# Patient Record
Sex: FEMALE | Race: WHITE | NOT HISPANIC OR LATINO | Employment: FULL TIME | URBAN - METROPOLITAN AREA
[De-identification: names, ages, dates, MRNs, and addresses within clinical notes are randomized per-mention and may not be internally consistent; named-entity substitution may affect disease eponyms.]

---

## 2017-04-22 ENCOUNTER — TELEPHONE (OUTPATIENT)
Dept: FAMILY MEDICINE | Facility: CLINIC | Age: 57
End: 2017-04-22

## 2017-04-22 NOTE — TELEPHONE ENCOUNTER
4/22/2017    Call Regarding Preventive Health Screening Colonoscopy and Cervical/PAP    Attempt 1    Message on voicemail     Comments:       Outreach   KV

## 2017-06-01 DIAGNOSIS — I10 ESSENTIAL HYPERTENSION WITH GOAL BLOOD PRESSURE LESS THAN 140/90: ICD-10-CM

## 2017-06-01 NOTE — TELEPHONE ENCOUNTER
amLODIPine (NORVASC) 5 MG tablet      Last Written Prescription Date: 5/24/16  Last Fill Quantity: 90, # refills: 3  Last Office Visit with [facility] [place] or [facility] prescribing provider: 5/24/16       Potassium   Date Value Ref Range Status   05/24/2016 3.6 3.4 - 5.3 mmol/L Final     Creatinine   Date Value Ref Range Status   05/24/2016 0.59 0.52 - 1.04 mg/dL Final     BP Readings from Last 3 Encounters:   05/24/16 136/80   01/14/15 134/86   09/12/13 130/75       atenolol (TENORMIN) 100 MG tablet      Last Written Prescription Date: 5/24/16  Last Fill Quantity: 90, # refills: 3  Last Office Visit with [facility] [place] or [facility] prescribing provider: 5/24/16       Potassium   Date Value Ref Range Status   05/24/2016 3.6 3.4 - 5.3 mmol/L Final     Creatinine   Date Value Ref Range Status   05/24/2016 0.59 0.52 - 1.04 mg/dL Final     BP Readings from Last 3 Encounters:   05/24/16 136/80   01/14/15 134/86   09/12/13 130/75         hydrochlorothiazide (HYDRODIURIL) 25 MG tablet      Last Written Prescription Date: 5/24/16  Last Fill Quantity: 90, # refills: 3  Last Office Visit with [facility] [place] or [facility] prescribing provider: 5/24/16       Potassium   Date Value Ref Range Status   05/24/2016 3.6 3.4 - 5.3 mmol/L Final     Creatinine   Date Value Ref Range Status   05/24/2016 0.59 0.52 - 1.04 mg/dL Final     BP Readings from Last 3 Encounters:   05/24/16 136/80   01/14/15 134/86   09/12/13 130/75

## 2017-06-07 RX ORDER — AMLODIPINE BESYLATE 5 MG/1
TABLET ORAL
Qty: 90 TABLET | Refills: 0 | Status: SHIPPED | OUTPATIENT
Start: 2017-06-07 | End: 2017-06-09

## 2017-06-07 RX ORDER — HYDROCHLOROTHIAZIDE 25 MG/1
TABLET ORAL
Qty: 90 TABLET | Refills: 0 | Status: SHIPPED | OUTPATIENT
Start: 2017-06-07 | End: 2017-06-09

## 2017-06-07 RX ORDER — ATENOLOL 100 MG/1
TABLET ORAL
Qty: 90 TABLET | Refills: 0 | Status: SHIPPED | OUTPATIENT
Start: 2017-06-07 | End: 2017-06-09

## 2017-06-09 ENCOUNTER — OFFICE VISIT (OUTPATIENT)
Dept: FAMILY MEDICINE | Facility: CLINIC | Age: 57
End: 2017-06-09
Payer: COMMERCIAL

## 2017-06-09 VITALS
SYSTOLIC BLOOD PRESSURE: 137 MMHG | HEIGHT: 62 IN | DIASTOLIC BLOOD PRESSURE: 65 MMHG | TEMPERATURE: 98 F | HEART RATE: 65 BPM | WEIGHT: 142.9 LBS | BODY MASS INDEX: 26.3 KG/M2

## 2017-06-09 DIAGNOSIS — Z12.4 SCREENING FOR MALIGNANT NEOPLASM OF CERVIX: ICD-10-CM

## 2017-06-09 DIAGNOSIS — Z00.00 ANNUAL PHYSICAL EXAM: Primary | ICD-10-CM

## 2017-06-09 DIAGNOSIS — F41.9 ANXIETY: ICD-10-CM

## 2017-06-09 DIAGNOSIS — I10 ESSENTIAL HYPERTENSION WITH GOAL BLOOD PRESSURE LESS THAN 140/90: ICD-10-CM

## 2017-06-09 DIAGNOSIS — E78.1 HIGH TRIGLYCERIDES: ICD-10-CM

## 2017-06-09 LAB
CHOLEST SERPL-MCNC: 216 MG/DL
CREAT SERPL-MCNC: 0.63 MG/DL (ref 0.52–1.04)
GFR SERPL CREATININE-BSD FRML MDRD: NORMAL ML/MIN/1.7M2
GLUCOSE SERPL-MCNC: 115 MG/DL (ref 70–99)
HCV AB SERPL QL IA: NORMAL
HDLC SERPL-MCNC: 34 MG/DL
LDLC SERPL CALC-MCNC: 132 MG/DL
NONHDLC SERPL-MCNC: 182 MG/DL
POTASSIUM SERPL-SCNC: 3.4 MMOL/L (ref 3.4–5.3)
SODIUM SERPL-SCNC: 141 MMOL/L (ref 133–144)
TRIGL SERPL-MCNC: 251 MG/DL

## 2017-06-09 PROCEDURE — 82947 ASSAY GLUCOSE BLOOD QUANT: CPT | Performed by: NURSE PRACTITIONER

## 2017-06-09 PROCEDURE — G0145 SCR C/V CYTO,THINLAYER,RESCR: HCPCS | Performed by: NURSE PRACTITIONER

## 2017-06-09 PROCEDURE — 80061 LIPID PANEL: CPT | Performed by: NURSE PRACTITIONER

## 2017-06-09 PROCEDURE — 86803 HEPATITIS C AB TEST: CPT | Performed by: NURSE PRACTITIONER

## 2017-06-09 PROCEDURE — 87624 HPV HI-RISK TYP POOLED RSLT: CPT | Performed by: NURSE PRACTITIONER

## 2017-06-09 PROCEDURE — 99396 PREV VISIT EST AGE 40-64: CPT | Performed by: NURSE PRACTITIONER

## 2017-06-09 PROCEDURE — 36415 COLL VENOUS BLD VENIPUNCTURE: CPT | Performed by: NURSE PRACTITIONER

## 2017-06-09 PROCEDURE — 84132 ASSAY OF SERUM POTASSIUM: CPT | Performed by: NURSE PRACTITIONER

## 2017-06-09 PROCEDURE — 82565 ASSAY OF CREATININE: CPT | Performed by: NURSE PRACTITIONER

## 2017-06-09 PROCEDURE — 84295 ASSAY OF SERUM SODIUM: CPT | Performed by: NURSE PRACTITIONER

## 2017-06-09 RX ORDER — AMLODIPINE BESYLATE 5 MG/1
5 TABLET ORAL DAILY
Qty: 90 TABLET | Refills: 3 | Status: SHIPPED | OUTPATIENT
Start: 2017-06-09 | End: 2018-08-23

## 2017-06-09 RX ORDER — HYDROCHLOROTHIAZIDE 25 MG/1
25 TABLET ORAL DAILY
Qty: 90 TABLET | Refills: 3 | Status: SHIPPED | OUTPATIENT
Start: 2017-06-09 | End: 2018-08-23

## 2017-06-09 RX ORDER — ATENOLOL 100 MG/1
TABLET ORAL
Qty: 90 TABLET | Refills: 3 | Status: SHIPPED | OUTPATIENT
Start: 2017-06-09 | End: 2018-08-23

## 2017-06-09 NOTE — PATIENT INSTRUCTIONS

## 2017-06-09 NOTE — NURSING NOTE
"Chief Complaint   Patient presents with     Physical     Refill Request     pending       Initial /65  Pulse 65  Temp 98  F (36.7  C) (Tympanic)  Ht 5' 1.75\" (1.568 m)  Wt 142 lb 14.4 oz (64.8 kg)  BMI 26.35 kg/m2 Estimated body mass index is 26.35 kg/(m^2) as calculated from the following:    Height as of this encounter: 5' 1.75\" (1.568 m).    Weight as of this encounter: 142 lb 14.4 oz (64.8 kg).  Medication Reconciliation: complete  "

## 2017-06-09 NOTE — PROGRESS NOTES
HPI:  Ashley Coombs is a 56 year old female who presents to clinic today for an annual physical exam:    Submitted on 6/7/2017   Annual Exam:        Getting at least 3 servings of Calcium per day:: NO    Bi-annual eye exam:: NO    Dental care twice a year:: Yes    Sleep apnea or symptoms of sleep apnea:: None    Diet:: Regular (no restrictions)    Frequency of exercise:: 1 day/week    Taking medications regularly:: Yes    Medication side effects:: None    Additional concerns today:: No    PHQ-2 Score: 1    Duration of exercise:: 30-45 minutes     She has the following medical issues :   Patient Active Problem List   Diagnosis     Essential hypertension     Generalized anxiety disorder     Other specified periodontal diseases     Tobacco use disorder     Condyloma acuminatum     Hyperlipidemia LDL goal <130     Breast cancer (H)     Post-menopausal bleeding     GERD (gastroesophageal reflux disease)     Essential hypertension with goal blood pressure less than 140/90     Patient Active Problem List   Diagnosis     Essential hypertension     Generalized anxiety disorder     Other specified periodontal diseases     Tobacco use disorder     Condyloma acuminatum     Hyperlipidemia LDL goal <130     Breast cancer (H)     Post-menopausal bleeding     GERD (gastroesophageal reflux disease)     Essential hypertension with goal blood pressure less than 140/90     MEDICATIONS:  Current Outpatient Prescriptions   Medication Sig Dispense Refill     amLODIPine (NORVASC) 5 MG tablet Take 1 tablet (5 mg) by mouth daily 90 tablet 3     hydrochlorothiazide (HYDRODIURIL) 25 MG tablet Take 1 tablet (25 mg) by mouth daily 90 tablet 3     atenolol (TENORMIN) 100 MG tablet TAKE 1 TABLET (100 MG) BY MOUTH DAILY 90 tablet 3     anastrozole (ARIMIDEX) 1 MG tablet Take 1 tablet by mouth daily. 90 tablet 3     [DISCONTINUED] amLODIPine (NORVASC) 5 MG tablet TAKE 1 TABLET (5 MG) BY MOUTH ONCE DAILY 90 tablet 0     [DISCONTINUED] atenolol  (TENORMIN) 100 MG tablet TAKE 1 TABLET (100 MG) BY MOUTH DAILY 90 tablet 0     [DISCONTINUED] hydrochlorothiazide (HYDRODIURIL) 25 MG tablet TAKE 1 TABLET (25 MG) BY MOUTH ONCE DAILY 90 tablet 0     cholecalciferol 5000 UNITS CAPS Take  by mouth.       Multiple Vitamin (MULTIVITAMIN OR) Take 1 tablet by mouth daily.       ascorbic acid (VITAMIN C) 1000 MG TABS Take 1,000 mg by mouth daily.       ALLERGIES: Zyban [bupropion hcl]    PAST MEDICAL HX:   Past Medical History:   Diagnosis Date     Closed fracture of unspecified part of fibula 2004    Right distal fibula fracture     Panic disorder without agoraphobia      PAST SURGICAL HX:   Past Surgical History:   Procedure Laterality Date     BIOPSY BREAST NEEDLE LOCALIZATION, BIOPSY NODE SENTINEL, COMBINED  7/27/2011    Procedure:COMBINED BIOPSY BREAST NEEDLE LOCALIZATION, BIOPSY NODE SENTINEL; Oeft  breast lumpectomy with sentinel lymph node biopsy-inj by radiologist at1330-path in house; Surgeon:ISADORA AGUILAR; Location:[facility] OR     GYN SURGERY       LUMPECTOMY BREAST  8/10/2011    Procedure:LUMPECTOMY BREAST; Re Excision Left Lumpectomy; Surgeon:ISADORA AGUILAR; Location:[facility] OR     SURGICAL HISTORY OF -   1978    [place] teeth extracted     FAMILY MEDICAL HX:   Family History   Problem Relation Age of Onset     Arthritis Mother      C.A.D. Mother      CANCER Mother      brain tumor     Hypertension Mother      Thyroid Disease Mother      Hypertension Father      Alcohol/Drug Father      Cardiovascular Father 83     AAA     OSTEOPOROSIS Sister 55     osteopenia     Psychotic Disorder Sister      anxiety     CANCER Maternal Grandmother      uterine cancer     Thyroid Disease Maternal Grandmother      C.A.D. Maternal Aunt      C.A.D. Maternal Uncle      DIABETES No family hx of      CEREBROVASCULAR DISEASE No family hx of      Breast Cancer No family hx of      Cancer - colorectal No family hx of      IMMUNIZATION HX:   Immunization History  "  Administered Date(s) Administered     TD (ADULT, 7+) 10/28/2004     TDAP Vaccine (Adacel) 08/13/2012     SOCIAL HX:   Social History     Social History     Marital status:      Spouse name: N/A     Number of children: N/A     Years of education: N/A     Social History Main Topics     Smoking status: Current Every Day Smoker     Packs/day: 0.50     Years: 30.00     Types: Cigarettes     Smokeless tobacco: Never Used      Comment: cutting back and trying to quit     Alcohol use 0.0 oz/week     0 Standard drinks or equivalent per week      Comment: 5 beers per month     Drug use: No     Sexual activity: Yes     Partners: Male     Birth control/ protection: Condom     Other Topics Concern     Parent/Sibling W/ Cabg, Mi Or Angioplasty Before 65f 55m? No     Social History Narrative     ROS:  CONSTITUTIONAL: no fatigue, no unexpected change in weight. She c/o of so much work-related stress and anxiety. This is causing issues with coping (poor sleep hygiene, not able to exercise, still smoking).  SKIN: no worrisome rashes, no worrisome moles, no worrisome lesions. She does have a healing tick bite.  EYES: no acute vision problems or changes  ENT: no ear problems, no mouth problems, no throat problems. Has baseline dental issues and is being managed by dentist 4 times/yr.  RESP: no significant cough, no shortness of breath. She states that sometimes she notices a \"smokers cough.\"  BREAST: no masses, no tenderness, no discharge.  CV: no chest pain, no palpitations, no new or worsening peripheral edema.  GI: no nausea, no vomiting, no constipation, no diarrhea.  : no frequency, no dysuria, no hematuria.  MS: no claudication, no myalgias, no joint aches  NEURO: no weakness, no dizziness, no syncope, no headaches  ENDOCRINE: no skin/hair changes  PSYCHIATRIC: situational anxiety as noted above.    OBJECTIVE:  /65  Pulse 65  Temp 98  F (36.7  C) (Tympanic)  Ht 5' 1.75\" (1.568 m)  Wt 142 lb 14.4 oz (64.8 kg) "  BMI 26.35 kg/m2   Wt Readings from Last 1 Encounters:   06/09/17 142 lb 14.4 oz (64.8 kg)     Constitutional: mild distress, tearful, pleasant, hypervigilant.   Eyes: anicteric,conjunctiva pink, normal extra-ocular movements. She is overdue to see her ophthalmologist.    Ears, Nose and Throat: tympanic membranes clear, nose clear and free of lesions, throat clear.   Neck: supple with full range of motion, no thyromegaly.   Cardiovascular: regular rate and rhythm, normal S1 and S2, no murmurs, rubs or gallops, peripheral pulses full and symmetric   Respiratory: clear to auscultation, no wheezes or crackles, normal breath sounds   Gastrointestinal: positive bowel sounds, nontender, no hepatosplenomegaly, no masses    Pelvic:  Normal genital architecture without lesions, erythema or abnormal secretions Bartholin's, Urethra, Lutsen's normal. Urethral meatus: normal. Urethra: no masses, tenderness, or scarring      Vagina: moist, pink, rugae with creamy, white and odorless secretions  Cervix: no lesions and pink, moist, closed, without lesion or CMT  Musculoskeletal: full range of motion, no edema   Skin: no concerning lesions, no jaundice, temp normal. Tick bite to right axilla is healing, scab present without edema/rash/erythema.    Neurological: cranial nerves intact, normal strength and sensation, normal gait, normal speech, no tremor   Psychological: anxious. frustrated.  LYMPH: no axillary, cervical,  supraclavicular, or infraclavicular nodes      ASSESSMENT/PLAN:    (Z00.00) Annual physical exam  (primary encounter diagnosis)  Comment: Patient taking several medications, important to check renal function. Also due for recommended Hep C screening per guidelines (patient agreed to this testing). Patient is fasting today, therefore blood glucose and cholesterol will be checked. Patient agreed with this plan of care.  Patient states that her oncologist manages her mammogram, we ordered this annual testing for August  (future).   Plan: Glucose, Creatinine, Potassium, Lipid panel         reflex to direct LDL, Hepatitis C antibody, *MA        Screening Digital Bilateral        Stable. In-process.    (I10) Essential hypertension with goal blood pressure less than 140/90  Comment: The patient is meeting her BP goal with the prescribed medications. Patient counseled on exercise, healthy diet, and decreasing stress. When we brought up smoking cessation, the patient became tearful and was not ready to pursue smoking cessation at this time.  Plan: amLODIPine (NORVASC) 5 MG tablet,         hydrochlorothiazide (HYDRODIURIL) 25 MG tablet,        atenolol (TENORMIN) 100 MG tablet, Sodium        Stable.     (E78.1) High triglycerides  Comment: As noted above.  Plan: Lipid panel reflex to direct LDL        Stable.    (Z12.4) Screening for malignant neoplasm of cervix  Comment: Patient due for this screening per the guideline recommendations.    Plan: Pap imaged thin layer screen with HPV -         recommended age 30 - 65 years (select HPV order        below)        Stable. In-process.    (F41.9) Anxiety  Comment: The patient works as a  and presented tearfully stating that she is working too much and is not allowed to take vacation. She repeatedly stated that her coworkers give her too much work. She cannot take vacation because the work will pile up. She described working up to 7 days/week sometimes.  We discussed boundaries, saying no, her nursing home plan, coworkers who can support her, support from her spouse, etc.   Plan:  Patient given information on Mindfulness-Based Stress Reduction, but outwardly stated she didn't know how she could find the time. We recommended she pursue cognitive behavioral therapy, and even recommended in-house services here.  While she was very open to therapy, she stated that time was a barrier.  She assured us that she was going to consider this as an option to work through her  job-related stress/anxiety. All parties agreed that pursuing a pharmacological therapy was not appropriate at this time.     Here for preventive examination  Breast examination performed. No   Pap   Yes  If normal PAP results pap every 3 years.  Breast cancer screening indicated and ordered for August. Collaborate w/ oncology.   Colorectal screening indicated and patient has a kit at home which she will complete and mail.  Immunizations reviewed and updated in EPIC, reviewed and discussed briefly  Labs ordered PAP, Glucose, K+, Cr and BUN  Counseling was provided in the following areas:  regular exercise, weight management, healthy diet/nutrition, smoking cessation (patient not ready at this time due to work stress to pursue smoking cessation).    Patient Instructions     Preventive Health Recommendations  Female Ages 50 - 64    Yearly exam: See your health care provider every year in order to  o Review health changes.   o Discuss preventive care.    o Review your medicines if your doctor has prescribed any.      Get a Pap test every three years (unless you have an abnormal result and your provider advises testing more often).    If you get Pap tests with HPV test, you only need to test every 5 years, unless you have an abnormal result.     You do not need a Pap test if your uterus was removed (hysterectomy) and you have not had cancer.    You should be tested each year for STDs (sexually transmitted diseases) if you're at risk.     Have a mammogram every 1 to 2 years.    Have a colonoscopy at age 50, or have a yearly FIT test (stool test). These exams screen for colon cancer.      Have a cholesterol test every 5 years, or more often if advised.    Have a diabetes test (fasting glucose) every three years. If you are at risk for diabetes, you should have this test more often.     If you are at risk for osteoporosis (brittle bone disease), think about having a bone density scan (DEXA).    Shots: Get a flu shot each  year. Get a tetanus shot every 10 years.    Nutrition:     Eat at least 5 servings of fruits and vegetables each day.    Eat whole-grain bread, whole-wheat pasta and brown rice instead of white grains and rice.    Talk to your provider about Calcium and Vitamin D.     Lifestyle    Exercise at least 150 minutes a week (30 minutes a day, 5 days a week). This will help you control your weight and prevent disease.    Limit alcohol to one drink per day.    No smoking.     Wear sunscreen to prevent skin cancer.     See your dentist every six months for an exam and cleaning.    See your eye doctor every 1 to 2 years.      For anxiety recommend to see Yvonne, our counselor.    Schedule mammogram in August.    Labs: Creatinine, Potassium, Glucose and lipids     Blood pressure well controlled, continue your BP medications as prescribed.       Daisy Lai RN BSN, NP Student

## 2017-06-09 NOTE — MR AVS SNAPSHOT
After Visit Summary   6/9/2017    Ashley Coombs    MRN: [id number]           Patient Information     Date Of Birth          1960        Visit Information        Provider Department      6/9/2017 8:00 AM Cheryle Castellon APRN [facility]        Today's Diagnoses     Annual physical exam    -  1    Essential hypertension with goal blood pressure less than 140/90        High triglycerides        Screening for malignant neoplasm of cervix        Anxiety          Care Instructions      Preventive Health Recommendations  Female Ages 50 - 64    Yearly exam: See your health care provider every year in order to  o Review health changes.   o Discuss preventive care.    o Review your medicines if your doctor has prescribed any.      Get a Pap test every three years (unless you have an abnormal result and your provider advises testing more often).    If you get Pap tests with HPV test, you only need to test every 5 years, unless you have an abnormal result.     You do not need a Pap test if your uterus was removed (hysterectomy) and you have not had cancer.    You should be tested each year for STDs (sexually transmitted diseases) if you're at risk.     Have a mammogram every 1 to 2 years.    Have a colonoscopy at age 50, or have a yearly FIT test (stool test). These exams screen for colon cancer.      Have a cholesterol test every 5 years, or more often if advised.    Have a diabetes test (fasting glucose) every three years. If you are at risk for diabetes, you should have this test more often.     If you are at risk for osteoporosis (brittle bone disease), think about having a bone density scan (DEXA).    Shots: Get a flu shot each year. Get a tetanus shot every 10 years.    Nutrition:     Eat at least 5 servings of fruits and vegetables each day.    Eat whole-grain bread, whole-wheat pasta and brown rice instead of white grains and rice.    Talk to your provider about Calcium and  Vitamin D.     Lifestyle    Exercise at least 150 minutes a week (30 minutes a day, 5 days a week). This will help you control your weight and prevent disease.    Limit alcohol to one drink per day.    No smoking.     Wear sunscreen to prevent skin cancer.     See your dentist every six months for an exam and cleaning.    See your eye doctor every 1 to 2 years.      For anxiety recommend to see Yvonne our counselor.    Schedule mammogram in August    Labs: Creatinine, Potassium, Glucose and lipids     Blood pressure well controlled, continue your BP medications as prescribed.                     Follow-ups after your visit        Who to contact     If you have questions or need follow up information about today's clinic visit or your schedule please contact [facility] directly at [phone].  Normal or non-critical lab and imaging results will be communicated to you by [employer], letter or phone within 4 business days after the clinic has received the results. If you do not hear from us within 7 days, please contact the clinic through [employer] or phone. If you have a critical or abnormal lab result, we will notify you by phone as soon as possible.  Submit refill requests through [employer] or call your pharmacy and they will forward the refill request to us. Please allow 3 business days for your refill to be completed.          Additional Information About Your Visit        [employer] Information     [employer] gives you secure access to your electronic health record. If you see a primary care provider, you can also send messages to your care team and make appointments. If you have questions, please call your primary care clinic.  If you do not have a primary care provider, please call [phone] and they will assist you.        Care EveryWhere ID     This is your Care EveryWhere ID. This could be used by other organizations to access your [facility] medical records  [id number]        Your Vitals Were     Pulse  "Temperature Height BMI (Body Mass Index)          65 98  F (36.7  C) (Tympanic) 5' 1.75\" (1.568 m) 26.35 kg/m2         Blood Pressure from Last 3 Encounters:   06/09/17 137/65   05/24/16 136/80   01/14/15 134/86    Weight from Last 3 Encounters:   06/09/17 142 lb 14.4 oz (64.8 kg)   05/24/16 146 lb 8 oz (66.5 kg)   01/14/15 148 lb (67.1 kg)              We Performed the Following     Creatinine     Glucose     Lipid panel reflex to direct LDL     Pap imaged thin layer screen with HPV - recommended age 30 - 65 years (select HPV order below)     Potassium          Today's Medication Changes          These changes are accurate as of: 6/9/17  8:48 AM.  If you have any questions, ask your nurse or doctor.               These medicines have changed or have updated prescriptions.        Dose/Directions    amLODIPine 5 MG tablet   Commonly known as:  NORVASC   This may have changed:  See the new instructions.   Used for:  Essential hypertension with goal blood pressure less than 140/90   Changed by:  Cheryle Castellon APRN CNP        Dose:  5 mg   Take 1 tablet (5 mg) by mouth daily   Quantity:  90 tablet   Refills:  3       atenolol 100 MG tablet   Commonly known as:  TENORMIN   This may have changed:  See the new instructions.   Used for:  Essential hypertension with goal blood pressure less than 140/90   Changed by:  Cheryle Castellon APRN CNP        TAKE 1 TABLET (100 MG) BY MOUTH DAILY   Quantity:  90 tablet   Refills:  3       hydrochlorothiazide 25 MG tablet   Commonly known as:  HYDRODIURIL   This may have changed:  See the new instructions.   Used for:  Essential hypertension with goal blood pressure less than 140/90   Changed by:  Cheryle Castellon APRN CNP        Dose:  25 mg   Take 1 tablet (25 mg) by mouth daily   Quantity:  90 tablet   Refills:  3            Where to get your medicines      These medications were sent to [id number] - [place], [place] - [address] [place] [address]  2013 " [facility] [address]     Phone:  [phone]     amLODIPine 5 MG tablet    atenolol 100 MG tablet    hydrochlorothiazide 25 MG tablet                Primary Care Provider Office Phone # Fax #    Yaritza Trejogunner Arreola [phone]       [facility] [address] [facility] [address]        Thank you!     Thank you for choosing [facility]  for your care. Our goal is always to provide you with excellent care. Hearing back from our patients is one way we can continue to improve our services. Please take a few minutes to complete the written survey that you may receive in the mail after your visit with us. Thank you!             Your Updated Medication List - Protect others around you: Learn how to safely use, store and throw away your medicines at www.disposemymeds.org.          This list is accurate as of: 6/9/17  8:48 AM.  Always use your most recent med list.                   Brand Name Dispense Instructions for use    amLODIPine 5 MG tablet    NORVASC    90 tablet    Take 1 tablet (5 mg) by mouth daily       anastrozole 1 MG tablet    ARIMIDEX    90 tablet    Take 1 tablet by mouth daily.       ascorbic acid 1000 MG Tabs    vitamin C     Take 1,000 mg by mouth daily.       atenolol 100 MG tablet    TENORMIN    90 tablet    TAKE 1 TABLET (100 MG) BY MOUTH DAILY       cholecalciferol 5000 UNITS Caps      Take  by mouth.       hydrochlorothiazide 25 MG tablet    HYDRODIURIL    90 tablet    Take 1 tablet (25 mg) by mouth daily       MULTIVITAMIN PO      Take 1 tablet by mouth daily.

## 2017-06-09 NOTE — PROGRESS NOTES
Answers for HPI/ROS submitted by the patient on 6/7/2017   Annual Exam:  Getting at least 3 servings of Calcium per day:: NO  Bi-annual eye exam:: NO  Dental care twice a year:: Yes  Sleep apnea or symptoms of sleep apnea:: None  Diet:: Regular (no restrictions)  Frequency of exercise:: 1 day/week  Taking medications regularly:: Yes  Medication side effects:: None  Additional concerns today:: No  PHQ-2 Score: 1  Duration of exercise:: 30-45 minutes     SUBJECTIVE:     CC: Ashley Coombs is an 56 year old woman who presents for preventive health visit.     HYPERTENSION - Patient has longstanding history of mod-severe HTN , currently denies any symptoms referable to elevated blood pressure. Specifically denies chest pain, palpitations, dyspnea, orthopnea, PND or peripheral edema. Blood pressure readings have been in fair range. Current medication regimen is as listed below. Patient denies any side effects of medication.       Complains of anxiety and having a lot of stress related to her job. She states that she might be interested seeing a counselor, she states that she will think about it and call clinic if she decided to start therapy. Recommended Yvonne.   History of breast cancer, following oncologist, due for mammogram in August.          Healthy Habits:    Do you get at least three servings of calcium containing foods daily (dairy, green leafy vegetables, etc.)? no, taking calcium and/or vitamin D supplement: Taking Vitamin D    Amount of exercise or daily activities, outside of work: 1 day a week for 30-45 minutes     Problems taking medications regularly No    Medication side effects: No    Have you had an eye exam in the past two years? no    Do you see a dentist twice per year? yes  Do you have sleep apnea, excessive snoring or daytime drowsiness?no    Today's PHQ-2 Score:   PHQ-2 ( 1999 [employer]) 6/9/2017 6/7/2017   Q1: Little interest or pleasure in doing things 0 0   Q2: Feeling down, depressed or hopeless  0 1   PHQ-2 Score 0 1   Q1: Little interest or pleasure in doing things - Not at all   Q2: Feeling down, depressed or hopeless - Several days   PHQ-2 Score - 1       Abuse: Current or Past(Physical, Sexual or Emotional)- No  Do you feel safe in your environment - Yes    Social History   Substance Use Topics     Smoking status: Current Every Day Smoker     Packs/day: 0.50     Years: 30.00     Types: Cigarettes     Smokeless tobacco: Never Used      Comment: cutting back and trying to quit     Alcohol use 0.0 oz/week     0 Standard drinks or equivalent per week      Comment: 5 beers per month     The patient does not drink >3 drinks per day nor >7 drinks per week.    Recent Labs   Lab Test  05/24/16   0721  01/14/15   0925  09/12/13   0852   CHOL  204*  219*  233*   HDL  33*  36*  35*   LDL  101*  123  140*   TRIG  350*  302*  291*   CHOLHDLRATIO   --   6.1*  7.0*   NHDL  171*   --    --        Reviewed orders with patient.  Reviewed health maintenance and updated orders accordingly - Yes    Mammo Decision Support:  Patient over age 50, mutual decision to screen reflected in health maintenance.    Pertinent mammograms are reviewed under the imaging tab.  History of abnormal Pap smear: NO - age 30- 65 PAP every 3 years recommended    Reviewed and updated as needed this visit by clinical staff  Tobacco  Allergies  Med Hx  Surg Hx  Fam Hx  Soc Hx        Reviewed and updated as needed this visit by Provider            ROS:  C: NEGATIVE for fever, chills, change in weight  I: NEGATIVE for worrisome rashes, moles or lesions  E: NEGATIVE for vision changes or irritation  ENT: NEGATIVE for ear, mouth and throat problems  R: NEGATIVE for significant cough or SOB  B: NEGATIVE for masses, tenderness or discharge  CV: NEGATIVE for chest pain, palpitations or peripheral edema  GI: NEGATIVE for nausea, abdominal pain, heartburn, or change in bowel habits  : NEGATIVE for unusual urinary or vaginal symptoms. Periods are  "regular.  M: NEGATIVE for significant arthralgias or myalgia  N: NEGATIVE for weakness, dizziness or paresthesias  PSYCHIATRIC: POSITIVE for anxiety, complains of stressful job     Problem list, Medication list, Allergies, and Medical/Social/Surgical histories reviewed in [facility] and updated as appropriate.  OBJECTIVE:     /65  Pulse 65  Temp 98  F (36.7  C) (Tympanic)  Ht 5' 1.75\" (1.568 m)  Wt 142 lb 14.4 oz (64.8 kg)  BMI 26.35 kg/m2  EXAM:  GENERAL: healthy, alert and no distress  NECK: no adenopathy, no asymmetry, masses, or scars and thyroid normal to palpation  RESP: lungs clear to auscultation - no rales, rhonchi or wheezes  CV: regular rate and rhythm, normal S1 S2, no S3 or S4, no murmur, click or rub, no peripheral edema and peripheral pulses strong  ABDOMEN: soft, nontender, no hepatosplenomegaly, no masses and bowel sounds normal   (female): normal female external genitalia, normal urethral meatus , vaginal mucosa pink, moist, well rugated and normal cervix, pap test done.   MS: no gross musculoskeletal defects noted, no edema  SKIN: no suspicious lesions or rashes    ASSESSMENT/PLAN:     1. Annual physical exam  - Glucose  - Creatinine  - Potassium  - Lipid panel reflex to direct LDL  - Hepatitis C antibody    2. Essential hypertension with goal blood pressure less than 140/90  -well controlled, refills provided   - amLODIPine (NORVASC) 5 MG tablet; Take 1 tablet (5 mg) by mouth daily  Dispense: 90 tablet; Refill: 3  - hydrochlorothiazide (HYDRODIURIL) 25 MG tablet; Take 1 tablet (25 mg) by mouth daily  Dispense: 90 tablet; Refill: 3  - atenolol (TENORMIN) 100 MG tablet; TAKE 1 TABLET (100 MG) BY MOUTH DAILY  Dispense: 90 tablet; Refill: 3  - Sodium    3. High triglycerides  -lipid panel  -continue to follow low fat diet and exercise  -if triglycerides still elevated patient may consider fish oil supplement and fiber supplement, such as Metamucil     4. Screening for malignant neoplasm of " "cervix    - Pap imaged thin layer screen with HPV - recommended age 30 - 65 years (select HPV order below)    5. Anxiety  -recommended psychology counseling, she would like to think about it, will call clinic if she decided to see psychologist      COUNSELING:   Reviewed preventive health counseling, as reflected in patient instructions       Regular exercise       Healthy diet/nutrition       Vision screening       Colon cancer screening       Consider Hep C screening for patients born between 1945 and 1965         reports that she has been smoking Cigarettes.  She has a 15.00 pack-year smoking history. She has never used smokeless tobacco.    Estimated body mass index is 26.35 kg/(m^2) as calculated from the following:    Height as of this encounter: 5' 1.75\" (1.568 m).    Weight as of this encounter: 142 lb 14.4 oz (64.8 kg).   Weight management plan: Discussed healthy diet and exercise guidelines and patient will follow up in 12 months in clinic to re-evaluate.    Counseling Resources:  ATP IV Guidelines  Pooled Cohorts Equation Calculator  Breast Cancer Risk Calculator  FRAX Risk Assessment  ICSI Preventive Guidelines  Dietary Guidelines for Americans, 2010  USDA's MyPlate  ASA Prophylaxis  Lung CA Screening    CAROLA Vargas [facility]  "

## 2017-06-15 LAB
COPATH REPORT: NORMAL
PAP: NORMAL

## 2017-06-16 LAB
FINAL DIAGNOSIS: NORMAL
HPV HR 12 DNA CVX QL NAA+PROBE: NEGATIVE
HPV16 DNA SPEC QL NAA+PROBE: NEGATIVE
HPV18 DNA SPEC QL NAA+PROBE: NEGATIVE
SPECIMEN DESCRIPTION: NORMAL

## 2017-09-06 ENCOUNTER — TELEPHONE (OUTPATIENT)
Dept: FAMILY MEDICINE | Facility: CLINIC | Age: 57
End: 2017-09-06

## 2017-09-06 DIAGNOSIS — I10 ESSENTIAL HYPERTENSION WITH GOAL BLOOD PRESSURE LESS THAN 140/90: Primary | ICD-10-CM

## 2017-09-06 NOTE — TELEPHONE ENCOUNTER
Reason for Call:  Medication or medication refill:  atenolol (TENORMIN) 100 MG tablet    Do you use a [facility]?  Name of the pharmacy and phone number for the current request: [facility]ael[facility] Carrillo     Name of the medication requested: atenolol (TENORMIN) 100 MG tablet   Pharmacy requesting  Change   From 100 mg   To 50 mg  Two tablets   Not able to get 100 mg tablets   an we leave a detailed message on this number? YES    Phone number patient can be reached at: Other phone number:  [phone]    Best Time: any   Call taken on 9/6/2017 at 1:31 PM by [facility]

## 2017-09-07 RX ORDER — ATENOLOL 50 MG/1
100 TABLET ORAL DAILY
Qty: 90 TABLET | Refills: 2 | Status: SHIPPED | OUTPATIENT
Start: 2017-09-07 | End: 2018-05-28

## 2017-09-07 NOTE — TELEPHONE ENCOUNTER
New order placed. Pharmacy has been notified. Pharmacy has corrected the dispense amount to 180tabs. 3 months.   Sue Higgins RN

## 2017-09-26 ENCOUNTER — HOSPITAL ENCOUNTER (OUTPATIENT)
Dept: MAMMOGRAPHY | Facility: CLINIC | Age: 57
Discharge: HOME | End: 2017-09-26
Attending: NURSE PRACTITIONER | Admitting: NURSE PRACTITIONER
Payer: COMMERCIAL

## 2017-09-26 DIAGNOSIS — Z12.31 VISIT FOR SCREENING MAMMOGRAM: ICD-10-CM

## 2017-09-26 PROCEDURE — 77063 BREAST TOMOSYNTHESIS BI: CPT

## 2017-09-26 PROCEDURE — G0202 SCR MAMMO BI INCL CAD: HCPCS

## 2018-02-23 ENCOUNTER — TELEPHONE (OUTPATIENT)
Dept: FAMILY MEDICINE | Facility: CLINIC | Age: 58
End: 2018-02-23

## 2018-02-23 NOTE — TELEPHONE ENCOUNTER
Panel Management Review      Patient has the following on her problem list: None      Composite cancer screening  Chart review shows that this patient is due/due soon for the following Colonoscopy and Fecal Colorectal (FIT)  Summary:    Patient is due/failing the following:   COLONOSCOPY and FIT    Action needed:   Patient needs referral/order: Fit test/Colonoscopy     Type of outreach:    Sent letter.    Questions for provider review:    None                                                                                                                                    Bridgett Hall

## 2018-02-23 NOTE — LETTER
[facility]  [address] [address]  [phone]        February 23, 2018  Ashley Coombs  [address] [address]    Dear Ashley,    I care about your health and have reviewed your health plan. I have reviewed your medical conditions, medication list, and lab results and am making recommendations based on this review, to better manage your health.    You are in particular need of attention regarding:  -Colon Cancer Screening    I am recommending that you:  -schedule a COLONOSCOPY to look for colon cancer (due every 10 years or 5 years in higher risk situations.)   Colon cancer is now the second leading cause of death in the [place] for both men and women and there are over 130,000 new cases and 50,000 deaths per year from colon cancer.  Colonoscopies can prevent 90-95% of these deaths.  Problem lesions can be removed before they ever become cancer.  This test is not only looking for cancer, but also getting rid of precancerious lesions.  If you do not wish to do a colonoscopy or cannot afford to do one, at this time, there is another option. It is called a FIT test or Fecal Immunochemical Occult Blood Test (take home stool sample kit).  It does not replace the colonoscopy for colorectal cancer screening, but it can detect hidden bleeding in the lower colon.  It does need to be repeated every year and if a positive result is obtained, you would be referred for a colonoscopy.  If you have completed either one of these tests at another facility, please have the records sent to our clinic so that we can best coordinate your care.    Here is a list of Health Maintenance topics that are due now or due soon:  Health Maintenance Due   Topic Date Due     ADVANCE DIRECTIVE PLANNING Q5 YRS  09/01/2014     TOBACCO CESSATION COUNSELING Q1 YR  01/14/2016     FIT Q1 YR  04/22/2016     INFLUENZA VACCINE (SYSTEM ASSIGNED)  09/01/2017       Please  call us at [phone] (or use [employer]) to address the above recommendations.     Thank you for trusting [facility] and we appreciate the opportunity to serve you.  We look forward to supporting your healthcare needs in the future.    Healthy Regards,    [facility] team

## 2018-05-28 DIAGNOSIS — I10 ESSENTIAL HYPERTENSION WITH GOAL BLOOD PRESSURE LESS THAN 140/90: ICD-10-CM

## 2018-05-29 RX ORDER — ATENOLOL 50 MG/1
TABLET ORAL
Qty: 60 TABLET | Refills: 0 | Status: SHIPPED | OUTPATIENT
Start: 2018-05-29 | End: 2018-08-23

## 2018-05-29 NOTE — TELEPHONE ENCOUNTER
"Requested Prescriptions   Pending Prescriptions Disp Refills     atenolol (TENORMIN) 50 MG tablet [Pharmacy Med Name: Atenolol Oral Tablet 50 MG]        Last Written Prescription Date:  9-7-17  Last Fill Quantity: 90,   # refills: 2  Last Office Visit: 6-9-17  Future Office visit:      180 tablet 1     Sig: TAKE TWO TABLETS BY MOUTH ONCE DAILY    Beta-Blockers Protocol Passed    5/28/2018  7:00 AM       Passed - Blood pressure under 140/90 in past 12 months    BP Readings from Last 3 Encounters:   06/09/17 137/65   05/24/16 136/80   01/14/15 134/86                Passed - Patient is age 6 or older       Passed - Recent (12 mo) or future (30 days) visit within the authorizing provider's specialty    Patient had office visit in the last 12 months or has a visit in the next 30 days with authorizing provider or within the authorizing provider's specialty.  See \"Patient Info\" tab in inbasket, or \"Choose Columns\" in Meds & Orders section of the refill encounter.              "

## 2018-05-29 NOTE — TELEPHONE ENCOUNTER
Prescription approved per [facility] Refill Protocol.    Mya PUGH RN

## 2018-06-19 ENCOUNTER — TRANSFERRED RECORDS (OUTPATIENT)
Dept: HEALTH INFORMATION MANAGEMENT | Facility: CLINIC | Age: 58
End: 2018-06-19

## 2018-08-23 ENCOUNTER — OFFICE VISIT (OUTPATIENT)
Dept: FAMILY MEDICINE | Facility: CLINIC | Age: 58
End: 2018-08-23
Payer: COMMERCIAL

## 2018-08-23 VITALS
HEART RATE: 63 BPM | HEIGHT: 62 IN | BODY MASS INDEX: 26.98 KG/M2 | SYSTOLIC BLOOD PRESSURE: 136 MMHG | TEMPERATURE: 97 F | DIASTOLIC BLOOD PRESSURE: 84 MMHG | WEIGHT: 146.6 LBS

## 2018-08-23 DIAGNOSIS — E78.5 HYPERLIPIDEMIA LDL GOAL <100: ICD-10-CM

## 2018-08-23 DIAGNOSIS — D18.01 ANGIOMA OF SKIN: ICD-10-CM

## 2018-08-23 DIAGNOSIS — I10 ESSENTIAL HYPERTENSION: ICD-10-CM

## 2018-08-23 DIAGNOSIS — Z00.00 ANNUAL PHYSICAL EXAM: Primary | ICD-10-CM

## 2018-08-23 LAB
ANION GAP SERPL CALCULATED.3IONS-SCNC: 11 MMOL/L (ref 3–14)
BUN SERPL-MCNC: 16 MG/DL (ref 7–30)
CALCIUM SERPL-MCNC: 8.9 MG/DL (ref 8.5–10.1)
CHLORIDE SERPL-SCNC: 104 MMOL/L (ref 94–109)
CHOLEST SERPL-MCNC: 222 MG/DL
CO2 SERPL-SCNC: 24 MMOL/L (ref 20–32)
CREAT SERPL-MCNC: 0.63 MG/DL (ref 0.52–1.04)
GFR SERPL CREATININE-BSD FRML MDRD: >90 ML/MIN/1.7M2
GLUCOSE SERPL-MCNC: 118 MG/DL (ref 70–99)
HDLC SERPL-MCNC: 37 MG/DL
LDLC SERPL CALC-MCNC: 134 MG/DL
NONHDLC SERPL-MCNC: 185 MG/DL
POTASSIUM SERPL-SCNC: 3.9 MMOL/L (ref 3.4–5.3)
SODIUM SERPL-SCNC: 139 MMOL/L (ref 133–144)
TRIGL SERPL-MCNC: 255 MG/DL

## 2018-08-23 PROCEDURE — 80061 LIPID PANEL: CPT | Performed by: NURSE PRACTITIONER

## 2018-08-23 PROCEDURE — 80048 BASIC METABOLIC PNL TOTAL CA: CPT | Performed by: NURSE PRACTITIONER

## 2018-08-23 PROCEDURE — 99396 PREV VISIT EST AGE 40-64: CPT | Performed by: NURSE PRACTITIONER

## 2018-08-23 PROCEDURE — 36415 COLL VENOUS BLD VENIPUNCTURE: CPT | Performed by: NURSE PRACTITIONER

## 2018-08-23 PROCEDURE — 82274 ASSAY TEST FOR BLOOD FECAL: CPT | Performed by: NURSE PRACTITIONER

## 2018-08-23 RX ORDER — AMLODIPINE BESYLATE 5 MG/1
5 TABLET ORAL DAILY
Qty: 90 TABLET | Refills: 3 | Status: SHIPPED | OUTPATIENT
Start: 2018-08-23 | End: 2019-08-18

## 2018-08-23 RX ORDER — ATENOLOL 100 MG/1
TABLET ORAL
Qty: 90 TABLET | Refills: 3 | Status: SHIPPED | OUTPATIENT
Start: 2018-08-23 | End: 2019-08-18

## 2018-08-23 RX ORDER — HYDROCHLOROTHIAZIDE 25 MG/1
25 TABLET ORAL DAILY
Qty: 90 TABLET | Refills: 3 | Status: SHIPPED | OUTPATIENT
Start: 2018-08-23 | End: 2019-08-18

## 2018-08-23 RX ORDER — ATORVASTATIN CALCIUM 10 MG/1
10 TABLET, FILM COATED ORAL DAILY
Qty: 90 TABLET | Refills: 1 | Status: SHIPPED | OUTPATIENT
Start: 2018-08-23 | End: 2020-07-22

## 2018-08-23 NOTE — PATIENT INSTRUCTIONS
Preventive Health Recommendations  Female Ages 50 - 64    Yearly exam: See your health care provider every year in order to  o Review health changes.   o Discuss preventive care.    o Review your medicines if your doctor has prescribed any.      Get a Pap test every three years (unless you have an abnormal result and your provider advises testing more often).    If you get Pap tests with HPV test, you only need to test every 5 years, unless you have an abnormal result.     You do not need a Pap test if your uterus was removed (hysterectomy) and you have not had cancer.    You should be tested each year for STDs (sexually transmitted diseases) if you're at risk.     Have a mammogram every 1 to 2 years.    Have a colonoscopy at age 50, or have a yearly FIT test (stool test). These exams screen for colon cancer.      Have a cholesterol test every 5 years, or more often if advised.    Have a diabetes test (fasting glucose) every three years. If you are at risk for diabetes, you should have this test more often.     If you are at risk for osteoporosis (brittle bone disease), think about having a bone density scan (DEXA).    Shots: Get a flu shot each year. Get a tetanus shot every 10 years.    Nutrition:     Eat at least 5 servings of fruits and vegetables each day.    Eat whole-grain bread, whole-wheat pasta and brown rice instead of white grains and rice.    Get adequate Calcium and Vitamin D.     Lifestyle    Exercise at least 150 minutes a week (30 minutes a day, 5 days a week). This will help you control your weight and prevent disease.    Limit alcohol to one drink per day.    No smoking.     Wear sunscreen to prevent skin cancer.     See your dentist every six months for an exam and cleaning.    See your eye doctor every 1 to 2 years.    Angioma, or pyogenic granuloma, these are benign, but can cause bleeding, please schedule with dermatology    BP meds: Atenolol 100 mg daily AM  Norvasc 5 mg daily  PM  hydrochlorothiazide  25 mg daily AM    Labs: lipid panel, kidney function and electrolytes

## 2018-08-23 NOTE — PROGRESS NOTES
SUBJECTIVE:   CC: Ashley Coombs is an 57 year old woman who presents for preventive health visit.     Healthy Habits:    Do you get at least three servings of calcium containing foods daily (dairy, green leafy vegetables, etc.)? Pt states she eats a lot of cheese but milk intake has been down.     Amount of exercise or daily activities, outside of work: Walks for exercise     Problems taking medications regularly No    Medication side effects: No    Have you had an eye exam in the past two years? yes    Do you see a dentist twice per year? yes    Do you have sleep apnea, excessive snoring or daytime drowsiness?no    Hypertension Follow-up      Outpatient blood pressures are being checked at home.  Results are 120's/80's.    Low Salt Diet: not monitoring salt    DERM    Patient has a spot of broken blood vessels on the left side of her face. She states the spot has been there for several years. It has become larger and she would like to see dermatology.    Today's PHQ-2 Score:   PHQ-2 ( 1999 Pfizer) 8/23/2018 6/9/2017   Q1: Little interest or pleasure in doing things 0 0   Q2: Feeling down, depressed or hopeless 0 0   PHQ-2 Score 0 0   Q1: Little interest or pleasure in doing things - -   Q2: Feeling down, depressed or hopeless - -   PHQ-2 Score - -     Abuse: Current or Past(Physical, Sexual or Emotional)- No  Do you feel safe in your environment - Yes    Social History   Substance Use Topics     Smoking status: Current Every Day Smoker     Packs/day: 0.50     Years: 30.00     Types: Cigarettes     Smokeless tobacco: Never Used      Comment: cutting back and trying to quit     Alcohol use 0.0 oz/week     0 Standard drinks or equivalent per week      Comment: 5 beers per month     If you drink alcohol do you typically have >3 drinks per day or >7 drinks per week? No                     Reviewed orders with patient.  Reviewed health maintenance and updated orders accordingly - Yes      Patient over age 50, mutual  "decision to screen reflected in health maintenance.    Pertinent mammograms are reviewed under the imaging tab.  History of abnormal Pap smear: NO - age 30- 65 PAP every 3 years recommended  PAP / HPV Latest Ref Rng & Units 6/9/2017 8/13/2012 8/5/2011   PAP - NIL NIL NIL   HPV 16 DNA NEG Negative - -   HPV 18 DNA NEG Negative - -   OTHER HR HPV NEG Negative - -     Reviewed and updated as needed this visit by clinical staff  Tobacco  Allergies  Meds  Med Hx  Surg Hx  Fam Hx  Soc Hx        Reviewed and updated as needed this visit by Provider            ROS:  CONSTITUTIONAL: NEGATIVE for fever, chills, change in weight  INTEGUMENTARY/SKIN: POSITIVE for small mole on the left side of the face that patient thinks is getting bigger   EYES: NEGATIVE for vision changes or irritation  ENT: NEGATIVE for ear, mouth and throat problems  RESP: NEGATIVE for significant cough or SOB  BREAST: NEGATIVE for masses, tenderness or discharge  CV: NEGATIVE for chest pain, palpitations or peripheral edema  GI: NEGATIVE for nausea, abdominal pain, heartburn, or change in bowel habits  : NEGATIVE for unusual urinary or vaginal symptoms. No vaginal bleeding.  MUSCULOSKELETAL: NEGATIVE for significant arthralgias or myalgia  NEURO: NEGATIVE for weakness, dizziness or paresthesias  PSYCHIATRIC: NEGATIVE for changes in mood or affect     OBJECTIVE:   /84 (BP Location: Right arm, Patient Position: Chair, Cuff Size: Adult Regular)  Pulse 63  Temp 97  F (36.1  C) (Tympanic)  Ht 5' 2.25\" (1.581 m)  Wt 146 lb 9.6 oz (66.5 kg)  BMI 26.6 kg/m2  EXAM:  GENERAL: healthy, alert and no distress  EYES: Eyes grossly normal to inspection, PERRL and conjunctivae and sclerae normal  HENT: ear canals and TM's normal, nose and mouth without ulcers or lesions  NECK: no adenopathy, no asymmetry, masses, or scars and thyroid normal to palpation  RESP: lungs clear to auscultation - no rales, rhonchi or wheezes  CV: regular rate and rhythm, " normal S1 S2, no S3 or S4, no murmur, click or rub, no peripheral edema and peripheral pulses strong  ABDOMEN: soft, nontender, no hepatosplenomegaly, no masses and bowel sounds normal  MS: no gross musculoskeletal defects noted, no edema  SKIN: small angioma, possible pyogenic granuloma on the left side face    PSYCH: mentation appears normal, affect normal/bright    Diagnostic Test Results:  Results for orders placed or performed in visit on 08/23/18 (from the past 24 hour(s))   Basic metabolic panel   Result Value Ref Range    Sodium 139 133 - 144 mmol/L    Potassium 3.9 3.4 - 5.3 mmol/L    Chloride 104 94 - 109 mmol/L    Carbon Dioxide 24 20 - 32 mmol/L    Anion Gap 11 3 - 14 mmol/L    Glucose 118 (H) 70 - 99 mg/dL    Urea Nitrogen 16 7 - 30 mg/dL    Creatinine 0.63 0.52 - 1.04 mg/dL    GFR Estimate >90 >60 mL/min/1.7m2    GFR Estimate If Black >90 >60 mL/min/1.7m2    Calcium 8.9 8.5 - 10.1 mg/dL   Lipid panel reflex to direct LDL Fasting   Result Value Ref Range    Cholesterol 222 (H) <200 mg/dL    Triglycerides 255 (H) <150 mg/dL    HDL Cholesterol 37 (L) >49 mg/dL    LDL Cholesterol Calculated 134 (H) <100 mg/dL    Non HDL Cholesterol 185 (H) <130 mg/dL       ASSESSMENT/PLAN:   1. Annual physical exam  - Basic metabolic panel  - Lipid panel reflex to direct LDL Fasting    2. Essential hypertension  -well controlled   - Basic metabolic panel  - atenolol (TENORMIN) 100 MG tablet; TAKE 1 TABLET (100 MG) BY MOUTH DAILY  Dispense: 90 tablet; Refill: 3  - amLODIPine (NORVASC) 5 MG tablet; Take 1 tablet (5 mg) by mouth daily  Dispense: 90 tablet; Refill: 3  - hydrochlorothiazide (HYDRODIURIL) 25 MG tablet; Take 1 tablet (25 mg) by mouth daily  Dispense: 90 tablet; Refill: 3    3. Angioma of skin  - DERMATOLOGY REFERRAL    4. Hyperlipidemia   -no improvement with life style modifications, diet and exercise  -start Lipitor 10 mg daily, recheck lipid panel in 6 months      COUNSELING:   Reviewed preventive health  "counseling, as reflected in patient instructions       Regular exercise       Healthy diet/nutrition    BP Readings from Last 1 Encounters:   08/23/18 136/84     Estimated body mass index is 26.6 kg/(m^2) as calculated from the following:    Height as of this encounter: 5' 2.25\" (1.581 m).    Weight as of this encounter: 146 lb 9.6 oz (66.5 kg).      Weight management plan: Discussed healthy diet and exercise guidelines and patient will follow up in 12 months in clinic to re-evaluate.     reports that she has been smoking Cigarettes.  She has a 15.00 pack-year smoking history. She has never used smokeless tobacco.  Tobacco Cessation Action Plan: Information offered: Patient not interested at this time    Counseling Resources:  ATP IV Guidelines  Pooled Cohorts Equation Calculator  Breast Cancer Risk Calculator  FRAX Risk Assessment  ICSI Preventive Guidelines  Dietary Guidelines for Americans, 2010  USDA's MyPlate  ASA Prophylaxis  Lung CA Screening    CAROLA Vargas [facility]  "

## 2018-08-23 NOTE — MR AVS SNAPSHOT
After Visit Summary   8/23/2018    Ashley Coombs    MRN: [id number]           Patient Information     Date Of Birth          1960        Visit Information        Provider Department      8/23/2018 8:00 AM Cheryle Castellon APRN [facility]        Today's Diagnoses     Essential hypertension    -  1    Annual physical exam        Angioma of skin          Care Instructions      Preventive Health Recommendations  Female Ages 50 - 64    Yearly exam: See your health care provider every year in order to  o Review health changes.   o Discuss preventive care.    o Review your medicines if your doctor has prescribed any.      Get a Pap test every three years (unless you have an abnormal result and your provider advises testing more often).    If you get Pap tests with HPV test, you only need to test every 5 years, unless you have an abnormal result.     You do not need a Pap test if your uterus was removed (hysterectomy) and you have not had cancer.    You should be tested each year for STDs (sexually transmitted diseases) if you're at risk.     Have a mammogram every 1 to 2 years.    Have a colonoscopy at age 50, or have a yearly FIT test (stool test). These exams screen for colon cancer.      Have a cholesterol test every 5 years, or more often if advised.    Have a diabetes test (fasting glucose) every three years. If you are at risk for diabetes, you should have this test more often.     If you are at risk for osteoporosis (brittle bone disease), think about having a bone density scan (DEXA).    Shots: Get a flu shot each year. Get a tetanus shot every 10 years.    Nutrition:     Eat at least 5 servings of fruits and vegetables each day.    Eat whole-grain bread, whole-wheat pasta and brown rice instead of white grains and rice.    Get adequate Calcium and Vitamin D.     Lifestyle    Exercise at least 150 minutes a week (30 minutes a day, 5 days a week). This will help you  control your weight and prevent disease.    Limit alcohol to one drink per day.    No smoking.     Wear sunscreen to prevent skin cancer.     See your dentist every six months for an exam and cleaning.    See your eye doctor every 1 to 2 years.    Angioma, or pyogenic granuloma, these are benign, but can cause bleeding, please schedule with dermatology    BP meds: Atenolol 100 mg daily AM  Norvasc 5 mg daily PM  hydrochlorothiazide  25 mg daily AM    Labs: lipid panel, kidney function and electrolytes               Follow-ups after your visit        Additional Services     DERMATOLOGY REFERRAL       Your provider has referred you to: [facility]: [facility] [phone]   [email or web address]    Please be aware that coverage of these services is subject to the terms and limitations of your health insurance plan.  Call member services at your health plan with any benefit or coverage questions.      Please bring the following with you to your appointment:    (1) Any X-Rays, CTs or MRIs which have been performed.  Contact the facility where they were done to arrange for  prior to your scheduled appointment.    (2) List of current medications  (3) This referral request   (4) Any documents/labs given to you for this referral                  Who to contact     If you have questions or need follow up information about today's clinic visit or your schedule please contact [facility] directly at [phone].  Normal or non-critical lab and imaging results will be communicated to you by MyChart, letter or phone within 4 business days after the clinic has received the results. If you do not hear from us within 7 days, please contact the clinic through MyChart or phone. If you have a critical or abnormal lab result, we will notify you by phone as soon as possible.  Submit refill requests through [employer] or call your pharmacy and they will forward the refill  "request to us. Please allow 3 business days for your refill to be completed.          Additional Information About Your Visit        [email or web address] Information     [employer] gives you secure access to your electronic health record. If you see a primary care provider, you can also send messages to your care team and make appointments. If you have questions, please call your primary care clinic.  If you do not have a primary care provider, please call [phone] and they will assist you.        Care EveryWhere ID     This is your Care EveryWhere ID. This could be used by other organizations to access your [place] medical records  [id number]        Your Vitals Were     Pulse Temperature Height BMI (Body Mass Index)          63 97  F (36.1  C) (Tympanic) 5' 2.25\" (1.581 m) 26.6 kg/m2         Blood Pressure from Last 3 Encounters:   08/23/18 140/80   06/09/17 137/65   05/24/16 136/80    Weight from Last 3 Encounters:   08/23/18 146 lb 9.6 oz (66.5 kg)   06/09/17 142 lb 14.4 oz (64.8 kg)   05/24/16 146 lb 8 oz (66.5 kg)              We Performed the Following     Basic metabolic panel     DERMATOLOGY REFERRAL     Lipid panel reflex to direct LDL Fasting          Where to get your medicines      These medications were sent to [id number] - [place], [place] - [address]  [address] [address]     Phone:  [phone]     amLODIPine 5 MG tablet    atenolol 100 MG tablet    hydrochlorothiazide 25 MG tablet          Primary Care Provider Office Phone # Fax #    Cheryle Castellon, APRN -[phone] [address] [address]        Equal Access to Services     RASHEED BALLARD : Hadii aad ku hadasho Soomaali, waaxda luqadaha, qaybta kaalmada adeegyada, chris donovan . So [facility] [phone].    ATENCIÓN: Si habla español, tiene a mirza disposición servicios gratuitos de asistencia lingüística. Llame al [phone].    We comply " with applicable federal civil rights laws and [place] laws. We do not discriminate on the basis of race, color, national origin, age, disability, sex, sexual orientation, or gender identity.            Thank you!     Thank you for choosing [facility]  for your care. Our goal is always to provide you with excellent care. Hearing back from our patients is one way we can continue to improve our services. Please take a few minutes to complete the written survey that you may receive in the mail after your visit with us. Thank you!             Your Updated Medication List - Protect others around you: Learn how to safely use, store and throw away your medicines at www.disposemymeds.org.          This list is accurate as of 8/23/18  8:31 AM.  Always use your most recent med list.                   Brand Name Dispense Instructions for use Diagnosis    amLODIPine 5 MG tablet    NORVASC    90 tablet    Take 1 tablet (5 mg) by mouth daily    Essential hypertension       anastrozole 1 MG tablet    ARIMIDEX    90 tablet    Take 1 tablet by mouth daily.        ascorbic acid 1000 MG Tabs    vitamin C     Take 1,000 mg by mouth daily.        atenolol 100 MG tablet    TENORMIN    90 tablet    TAKE 1 TABLET (100 MG) BY MOUTH DAILY    Essential hypertension       cholecalciferol 5000 units Caps      Take  by mouth.        hydrochlorothiazide 25 MG tablet    HYDRODIURIL    90 tablet    Take 1 tablet (25 mg) by mouth daily    Essential hypertension       MULTIVITAMIN PO      Take 1 tablet by mouth daily.

## 2018-08-24 DIAGNOSIS — Z12.11 SPECIAL SCREENING FOR MALIGNANT NEOPLASMS, COLON: Primary | ICD-10-CM

## 2018-08-26 LAB — HEMOCCULT STL QL IA: ABNORMAL

## 2018-09-08 PROCEDURE — 82274 ASSAY TEST FOR BLOOD FECAL: CPT | Performed by: NURSE PRACTITIONER

## 2018-09-15 DIAGNOSIS — Z12.11 SPECIAL SCREENING FOR MALIGNANT NEOPLASMS, COLON: ICD-10-CM

## 2018-09-15 LAB — HEMOCCULT STL QL IA: NEGATIVE

## 2018-10-02 ENCOUNTER — HOSPITAL ENCOUNTER (OUTPATIENT)
Dept: MAMMOGRAPHY | Facility: CLINIC | Age: 58
Discharge: HOME | End: 2018-10-02
Attending: NURSE PRACTITIONER | Admitting: NURSE PRACTITIONER
Payer: COMMERCIAL

## 2018-10-02 ENCOUNTER — OFFICE VISIT (OUTPATIENT)
Dept: DERMATOLOGY | Facility: CLINIC | Age: 58
End: 2018-10-02
Payer: COMMERCIAL

## 2018-10-02 VITALS — SYSTOLIC BLOOD PRESSURE: 147 MMHG | HEART RATE: 72 BPM | OXYGEN SATURATION: 99 % | DIASTOLIC BLOOD PRESSURE: 79 MMHG

## 2018-10-02 DIAGNOSIS — Z12.31 VISIT FOR SCREENING MAMMOGRAM: ICD-10-CM

## 2018-10-02 DIAGNOSIS — D48.5 NEOPLASM OF UNCERTAIN BEHAVIOR OF SKIN: Primary | ICD-10-CM

## 2018-10-02 DIAGNOSIS — L82.1 SEBORRHEIC KERATOSIS: ICD-10-CM

## 2018-10-02 PROCEDURE — 99203 OFFICE O/P NEW LOW 30 MIN: CPT | Mod: 25 | Performed by: PHYSICIAN ASSISTANT

## 2018-10-02 PROCEDURE — 77067 SCR MAMMO BI INCL CAD: CPT

## 2018-10-02 PROCEDURE — 11100 HC BIOPSY SKIN/SUBQ/MUC MEM, SINGLE LESION: CPT | Performed by: PHYSICIAN ASSISTANT

## 2018-10-02 PROCEDURE — 88305 TISSUE EXAM BY PATHOLOGIST: CPT | Mod: TC | Performed by: PHYSICIAN ASSISTANT

## 2018-10-02 NOTE — PROGRESS NOTES
Ashley Coombs is a 58 year old year old female patient here today for spot on cheek. Present since 2012, and growing. She denies any pain or bleeding. She also notes new brown bumps on her back. Patient has no other skin complaints today.  Remainder of the HPI, Meds, PMH, Allergies, FH, and SH was reviewed in chart.    Pertinent Hx:   No personal history of skin cancer.   Past Medical History:   Diagnosis Date     Closed fracture of unspecified part of fibula 2004    Right distal fibula fracture     Panic disorder without agoraphobia        Past Surgical History:   Procedure Laterality Date     BIOPSY BREAST NEEDLE LOCALIZATION, BIOPSY NODE SENTINEL, COMBINED  7/27/2011    Procedure:COMBINED BIOPSY BREAST NEEDLE LOCALIZATION, BIOPSY NODE SENTINEL; Oeft  breast lumpectomy with sentinel lymph node biopsy-inj by radiologist at1330-path in house; Surgeon:ISADORA AGUILAR; Location:[place]     GYN SURGERY       LUMPECTOMY BREAST  8/10/2011    Procedure:LUMPECTOMY BREAST; Re Excision Left Lumpectomy; Surgeon:ISADORA AGUILAR; Location:[place]     SURGICAL HISTORY OF -   1978    [place] teeth extracted        Family History   Problem Relation Age of Onset     Arthritis Mother      C.A.D. Mother      Cancer Mother      brain tumor     Hypertension Mother      Thyroid Disease Mother      Hypertension Father      Alcohol/Drug Father      Cardiovascular Father 83     AAA     Osteoporosis Sister 55     osteopenia     Psychotic Disorder Sister      anxiety     Cancer Maternal Grandmother      uterine cancer     Thyroid Disease Maternal Grandmother      C.A.D. Maternal Aunt      C.A.D. Maternal Uncle      Diabetes No family hx of      Cerebrovascular Disease No family hx of      Breast Cancer No family hx of      Cancer - colorectal No family hx of        Social History     Social History     Marital status:      Spouse name: N/A     Number of children: N/A     Years of education: N/A     Occupational  History     Not on file.     Social History Main Topics     Smoking status: Current Every Day Smoker     Packs/day: 0.50     Years: 30.00     Types: Cigarettes     Smokeless tobacco: Never Used      Comment: cutting back and trying to quit     Alcohol use 0.0 oz/week     0 Standard drinks or equivalent per week      Comment: 5 beers per month     Drug use: No     Sexual activity: Yes     Partners: Male     Birth control/ protection: Condom     Other Topics Concern     Parent/Sibling W/ Cabg, Mi Or Angioplasty Before 65f 55m? No     Social History Narrative       Outpatient Encounter Prescriptions as of 10/2/2018   Medication Sig Dispense Refill     amLODIPine (NORVASC) 5 MG tablet Take 1 tablet (5 mg) by mouth daily 90 tablet 3     anastrozole (ARIMIDEX) 1 MG tablet Take 1 tablet by mouth daily. 90 tablet 3     ascorbic acid (VITAMIN C) 1000 MG TABS Take 1,000 mg by mouth daily.       atenolol (TENORMIN) 100 MG tablet TAKE 1 TABLET (100 MG) BY MOUTH DAILY 90 tablet 3     atorvastatin (LIPITOR) 10 MG tablet Take 1 tablet (10 mg) by mouth daily 90 tablet 1     cholecalciferol 5000 UNITS CAPS Take  by mouth.       hydrochlorothiazide (HYDRODIURIL) 25 MG tablet Take 1 tablet (25 mg) by mouth daily 90 tablet 3     Multiple Vitamin (MULTIVITAMIN OR) Take 1 tablet by mouth daily.       No facility-administered encounter medications on file as of 10/2/2018.              Review Of Systems  Skin: As above  Eyes: negative  Ears/Nose/Throat: negative  Respiratory: No shortness of breath, dyspnea on exertion, cough, or hemoptysis  Cardiovascular: negative  Gastrointestinal: negative  Genitourinary: negative  Musculoskeletal: negative  Neurologic: negative  Psychiatric: negative  Hematologic/Lymphatic/Immunologic: negative  Endocrine: negative      O:   NAD, WDWN, Alert & Oriented, Mood & Affect wnl, Vitals stable   Here today alone   /79 (BP Location: Right arm, Patient Position: Sitting, Cuff Size: Adult Regular)  Pulse  72  SpO2 99%   General appearance normal   Vitals stable   Alert, oriented and in no acute distress      0.6 cm bluish papule on left mid cheek   Brown stuck on papules on back       Eyes: Conjunctivae/lids:Normal     ENT: Lips: normal    MSK:Normal    Pulm: Breathing Normal    Neuro/Psych: Orientation:Normal; Mood/Affect:Normal  A/P:  1. R/O angioma on left mid cheek  TANGENTIAL BIOPSY SENT OUT:  After consent, anesthesia with LEC and prep, tangential excision performed, cauterized wound, and specimen sent out for permanent section histology.  No complications and routine wound care. Patient told to call our office in 1-2 weeks for result.      2. Seborrheic keratosis   BENIGN LESIONS DISCUSSED WITH PATIENT:  I discussed the specifics of tumor, prognosis, and genetics of benign lesions.  I explained that treatment of these lesions would be purely cosmetic and not medically neccessary.  I discussed with patient different removal options including excision, cautery and /or laser.      Nature and genetics of benign skin lesions dicussed with patient.  Signs and Symptoms of skin cancer discussed with patient.  ABCDEs of melanoma reviewed with patient.  Patient encouraged to perform monthly skin exams.  UV precautions reviewed with patient.  Risks of non-melanoma skin cancer discussed with patient   Return to clinic pending biopsy or sooner if needed.   Patient to follow up with Primary Care provider regarding elevated blood pressure.

## 2018-10-02 NOTE — LETTER
10/2/2018         RE: Ashley Coombs  [address] [address]        Dear Colleague,    Thank you for referring your patient, Ashley Coombs, to the [facility]. Please see a copy of my visit note below.    Ashley Coombs is a 58 year old year old female patient here today for spot on cheek. Present since 2012, and growing. She denies any pain or bleeding. She also notes new brown bumps on her back. Patient has no other skin complaints today.  Remainder of the HPI, Meds, PMH, Allergies, FH, and SH was reviewed in chart.    Pertinent Hx:   No personal history of skin cancer.   Past Medical History:   Diagnosis Date     Closed fracture of unspecified part of fibula 2004    Right distal fibula fracture     Panic disorder without agoraphobia        Past Surgical History:   Procedure Laterality Date     BIOPSY BREAST NEEDLE LOCALIZATION, BIOPSY NODE SENTINEL, COMBINED  7/27/2011    Procedure:COMBINED BIOPSY BREAST NEEDLE LOCALIZATION, BIOPSY NODE SENTINEL; Oeft  breast lumpectomy with sentinel lymph node biopsy-inj by radiologist at1330-path in house; Surgeon:ISADORA AGUILAR; Location:[place]     GYN SURGERY       LUMPECTOMY BREAST  8/10/2011    Procedure:LUMPECTOMY BREAST; Re Excision Left Lumpectomy; Surgeon:ISADORA AGUILAR; Location:[place]     SURGICAL HISTORY OF -   1978    [place] teeth extracted        Family History   Problem Relation Age of Onset     Arthritis Mother      C.A.D. Mother      Cancer Mother      brain tumor     Hypertension Mother      Thyroid Disease Mother      Hypertension Father      Alcohol/Drug Father      Cardiovascular Father 83     AAA     Osteoporosis Sister 55     osteopenia     Psychotic Disorder Sister      anxiety     Cancer Maternal Grandmother      uterine cancer     Thyroid Disease Maternal Grandmother      C.A.D. Maternal Aunt      C.A.D. Maternal Uncle      Diabetes No family hx of      Cerebrovascular Disease No family  hx of      Breast Cancer No family hx of      Cancer - colorectal No family hx of        Social History     Social History     Marital status:      Spouse name: N/A     Number of children: N/A     Years of education: N/A     Occupational History     Not on file.     Social History Main Topics     Smoking status: Current Every Day Smoker     Packs/day: 0.50     Years: 30.00     Types: Cigarettes     Smokeless tobacco: Never Used      Comment: cutting back and trying to quit     Alcohol use 0.0 oz/week     0 Standard drinks or equivalent per week      Comment: 5 beers per month     Drug use: No     Sexual activity: Yes     Partners: Male     Birth control/ protection: Condom     Other Topics Concern     Parent/Sibling W/ Cabg, Mi Or Angioplasty Before 65f 55m? No     Social History Narrative       Outpatient Encounter Prescriptions as of 10/2/2018   Medication Sig Dispense Refill     amLODIPine (NORVASC) 5 MG tablet Take 1 tablet (5 mg) by mouth daily 90 tablet 3     anastrozole (ARIMIDEX) 1 MG tablet Take 1 tablet by mouth daily. 90 tablet 3     ascorbic acid (VITAMIN C) 1000 MG TABS Take 1,000 mg by mouth daily.       atenolol (TENORMIN) 100 MG tablet TAKE 1 TABLET (100 MG) BY MOUTH DAILY 90 tablet 3     atorvastatin (LIPITOR) 10 MG tablet Take 1 tablet (10 mg) by mouth daily 90 tablet 1     cholecalciferol 5000 UNITS CAPS Take  by mouth.       hydrochlorothiazide (HYDRODIURIL) 25 MG tablet Take 1 tablet (25 mg) by mouth daily 90 tablet 3     Multiple Vitamin (MULTIVITAMIN OR) Take 1 tablet by mouth daily.       No facility-administered encounter medications on file as of 10/2/2018.              Review Of Systems  Skin: As above  Eyes: negative  Ears/Nose/Throat: negative  Respiratory: No shortness of breath, dyspnea on exertion, cough, or hemoptysis  Cardiovascular: negative  Gastrointestinal: negative  Genitourinary: negative  Musculoskeletal: negative  Neurologic: negative  Psychiatric:  negative  Hematologic/Lymphatic/Immunologic: negative  Endocrine: negative      O:   NAD, WDWN, Alert & Oriented, Mood & Affect wnl, Vitals stable   Here today alone   /79 (BP Location: Right arm, Patient Position: Sitting, Cuff Size: Adult Regular)  Pulse 72  SpO2 99%   General appearance normal   Vitals stable   Alert, oriented and in no acute distress      0.6 cm bluish papule on left mid cheek   Brown stuck on papules on back       Eyes: Conjunctivae/lids:Normal     ENT: Lips: normal    MSK:Normal    Pulm: Breathing Normal    Neuro/Psych: Orientation:Normal; Mood/Affect:Normal  A/P:  1. R/O angioma on left mid cheek  TANGENTIAL BIOPSY SENT OUT:  After consent, anesthesia with LEC and prep, tangential excision performed, cauterized wound, and specimen sent out for permanent section histology.  No complications and routine wound care. Patient told to call our office in 1-2 weeks for result.      2. Seborrheic keratosis   BENIGN LESIONS DISCUSSED WITH PATIENT:  I discussed the specifics of tumor, prognosis, and genetics of benign lesions.  I explained that treatment of these lesions would be purely cosmetic and not medically neccessary.  I discussed with patient different removal options including excision, cautery and /or laser.      Nature and genetics of benign skin lesions dicussed with patient.  Signs and Symptoms of skin cancer discussed with patient.  ABCDEs of melanoma reviewed with patient.  Patient encouraged to perform monthly skin exams.  UV precautions reviewed with patient.  Risks of non-melanoma skin cancer discussed with patient   Return to clinic pending biopsy or sooner if needed.   Patient to follow up with Primary Care provider regarding elevated blood pressure.      Again, thank you for allowing me to participate in the care of your patient.        Sincerely,        Althea Leija PA-C

## 2018-10-02 NOTE — PATIENT INSTRUCTIONS
Wound Care Instructions     FOR SUPERFICIAL WOUNDS     [facility] [phone]    [facility] [phone]                       AFTER 24 HOURS YOU SHOULD REMOVE THE BANDAGE AND BEGIN DAILY DRESSING CHANGES AS FOLLOWS:     1) Remove Dressing.     2) Clean and dry the area with tap water using a Q-tip or sterile gauze pad.     3) Apply Vaseline, Aquaphor, Polysporin ointment or Bacitracin ointment over entire wound.  Do NOT use Neosporin ointment.     4) Cover the wound with a band-aid, or a sterile non-stick gauze pad and micropore paper tape      REPEAT THESE INSTRUCTIONS AT LEAST ONCE A DAY UNTIL THE WOUND HAS COMPLETELY HEALED.    It is an old wives tale that a wound heals better when it is exposed to air and allowed to dry out. The wound will heal faster with a better cosmetic result if it is kept moist with ointment and covered with a bandage.    **Do not let the wound dry out.**      Supplies Needed:      *Cotton tipped applicators (Q-tips)    *Polysporin Ointment or Bacitracin Ointment (NOT NEOSPORIN)    *Band-aids or non-stick gauze pads and micropore paper tape.      PATIENT INFORMATION:    During the healing process you will notice a number of changes. All wounds develop a small halo of redness surrounding the wound.  This means healing is occurring. Severe itching with extensive redness usually indicates sensitivity to the ointment or bandage tape used to dress the wound.  You should call our office if this develops.      Swelling  and/or discoloration around your surgical site is common, particularly when performed around the eye.    All wounds normally drain.  The larger the wound the more drainage there will be.  After 7-10 days, you will notice the wound beginning to shrink and new skin will begin to grow.  The wound is healed when you can see skin has formed over the entire area.  A healed wound has a healthy, shiny look to the surface and is red to dark pink in color  to normalize.  Wounds may take approximately 4-6 weeks to heal.  Larger wounds may take 6-8 weeks.  After the wound is healed you may discontinue dressing changes.    You may experience a sensation of tightness as your wound heals. This is normal and will gradually subside.    Your healed wound may be sensitive to temperature changes. This sensitivity improves with time, but if you re having a lot of discomfort, try to avoid temperature extremes.    Patients frequently experience itching after their wound appears to have healed because of the continue healing under the skin.  Plain Vaseline will help relieve the itching.        POSSIBLE COMPLICATIONS    BLEEDIN. Leave the bandage in place.  2. Use tightly rolled up gauze or a cloth to apply direct pressure over the bandage for 30  minutes.  3. Reapply pressure for an additional 30 minutes if necessary  4. Use additional gauze and tape to maintain pressure once the bleeding has stopped.

## 2018-10-02 NOTE — MR AVS SNAPSHOT
After Visit Summary   10/2/2018    Ashley Coombs    MRN: [id number]           Patient Information     Date Of Birth          1960        Visit Information        Provider Department      10/2/2018 8:20 AM Althea Bernardo PA-C [facility]        Care Instructions          Wound Care Instructions     FOR SUPERFICIAL WOUNDS     [facility] [phone]    [facility] [phone]                       AFTER 24 HOURS YOU SHOULD REMOVE THE BANDAGE AND BEGIN DAILY DRESSING CHANGES AS FOLLOWS:     1) Remove Dressing.     2) Clean and dry the area with tap water using a Q-tip or sterile gauze pad.     3) Apply Vaseline, Aquaphor, Polysporin ointment or Bacitracin ointment over entire wound.  Do NOT use Neosporin ointment.     4) Cover the wound with a band-aid, or a sterile non-stick gauze pad and micropore paper tape      REPEAT THESE INSTRUCTIONS AT LEAST ONCE A DAY UNTIL THE WOUND HAS COMPLETELY HEALED.    It is an old wives tale that a wound heals better when it is exposed to air and allowed to dry out. The wound will heal faster with a better cosmetic result if it is kept moist with ointment and covered with a bandage.    **Do not let the wound dry out.**      Supplies Needed:      *Cotton tipped applicators (Q-tips)    *Polysporin Ointment or Bacitracin Ointment (NOT NEOSPORIN)    *Band-aids or non-stick gauze pads and micropore paper tape.      PATIENT INFORMATION:    During the healing process you will notice a number of changes. All wounds develop a small halo of redness surrounding the wound.  This means healing is occurring. Severe itching with extensive redness usually indicates sensitivity to the ointment or bandage tape used to dress the wound.  You should call our office if this develops.      Swelling  and/or discoloration around your surgical site is common, particularly when performed around the eye.    All wounds normally drain.  The larger  the wound the more drainage there will be.  After 7-10 days, you will notice the wound beginning to shrink and new skin will begin to grow.  The wound is healed when you can see skin has formed over the entire area.  A healed wound has a healthy, shiny look to the surface and is red to dark pink in color to normalize.  Wounds may take approximately 4-6 weeks to heal.  Larger wounds may take 6-8 weeks.  After the wound is healed you may discontinue dressing changes.    You may experience a sensation of tightness as your wound heals. This is normal and will gradually subside.    Your healed wound may be sensitive to temperature changes. This sensitivity improves with time, but if you re having a lot of discomfort, try to avoid temperature extremes.    Patients frequently experience itching after their wound appears to have healed because of the continue healing under the skin.  Plain Vaseline will help relieve the itching.        POSSIBLE COMPLICATIONS    BLEEDIN. Leave the bandage in place.  2. Use tightly rolled up gauze or a cloth to apply direct pressure over the bandage for 30  minutes.  3. Reapply pressure for an additional 30 minutes if necessary  4. Use additional gauze and tape to maintain pressure once the bleeding has stopped.            Follow-ups after your visit        Your next 10 appointments already scheduled     Oct 02, 2018 10:45 AM CDT   MA SCREENING DIGITAL BILATERAL with W[place]MA2   [facility] ([facility])    [address] [address]   [phone]           How do I prepare for my exam? (Food and drink instructions) No Food and Drink Restrictions.  How do I prepare for my exam? (Other instructions) Do not use any powder, lotion or deodorant under your arms or on your breast. If you do, we will ask you to remove it before your exam.  What should I wear: Wear comfortable, two-piece clothing.  How long does the exam take: Most scans will take 15  "minutes.  What should I bring: Bring any previous mammograms from other facilities or have them mailed to the breast center.  Do I need a :  No  is needed.  What do I need to tell my doctor: If you have any allergies, tell your care team.  What should I do after the exam: No restrictions, You may resume normal activities.  What is this test: This test is an x-ray of the breast to look for breast disease. The breast is pressed between two plates to flatten and spread the tissue. An X-ray is taken of the breast from different angles.  Who should I call with questions: If you have any questions, please call the Imaging Department where you will have your exam. Directions, parking instructions, and other information is available on our website, [email or web address].  Other information about my exam Three-dimensional (3D) mammograms are available at [place] locations in [facility], [facility], [place], [facility] and [place]. [facility] locations include [place] and the [facility] in [place].  Benefits of 3D mammograms include * Improved rate of cancer detection * Decreases your chance of having to go back for more tests, which means fewer: * \"False-positive\" results (This means that there is an abnormal area but it isn't cancer.) * Invasive testing procedures, such as a biopsy or surgery * Can provide clearer images of the breast if you have dense breast tissue.  *3D mammography is an optional exam that anyone can have with a 2D mammogram. It doesn't replace or take the place of a 2D mammogram. 2D mammograms remain an effective screening test for all women.  Not all insurance companies cover the cost of a 3D mammogram. Check with your insurance. Three-dimensional (3D) mammograms are available at [place] locations in [facility], [facility], [facility], and [place]. [facility] locations include [place] and " "[facility] in [place]. Benefits of 3D mammograms include: - Improved rate of cancer detection - Decreases your chance of having to go back for more tests, which means fewer: - \"False-positive\" results (This means that there is an abnormal area but it isn't cancer.) - Invasive testing procedures, such as a biopsy or surgery - Can provide clearer images of the breast if you have dense breast tissue. 3D mammography is an optional exam that anyone can have with a 2D mammogram. It doesn't replace or take the place of a 2D mammogram. 2D mammograms remain an effective screening test for all women.  Not all insurance companies cover the cost of a 3D mammogram. Check with your insurance.              Who to contact     If you have questions or need follow up information about today's clinic visit or your schedule please contact [facility] directly at [phone].  Normal or non-critical lab and imaging results will be communicated to you by [employer], letter or phone within 4 business days after the clinic has received the results. If you do not hear from us within 7 days, please contact the clinic through [employer] or phone. If you have a critical or abnormal lab result, we will notify you by phone as soon as possible.  Submit refill requests through [employer] or call your pharmacy and they will forward the refill request to us. Please allow 3 business days for your refill to be completed.          Additional Information About Your Visit        [employer] Information     [employer] gives you secure access to your electronic health record. If you see a primary care provider, you can also send messages to your care team and make appointments. If you have questions, please call your primary care clinic.  If you do not have a primary care provider, please call [phone] and they will assist you.        Care EveryWhere ID     This is your Care EveryWhere ID. This could be used by other organizations to " access your [facility] medical records  [id number]        Your Vitals Were     Pulse Pulse Oximetry                72 99%           Blood Pressure from Last 3 Encounters:   10/02/18 147/79   08/23/18 136/84   06/09/17 137/65    Weight from Last 3 Encounters:   08/23/18 66.5 kg (146 lb 9.6 oz)   06/09/17 64.8 kg (142 lb 14.4 oz)   05/24/16 66.5 kg (146 lb 8 oz)              Today, you had the following     No orders found for display       Primary Care Provider Office Phone # Fax #    Cheryle Castellon, CAROLA [phone] [address] [address]        Equal Access to Services     KLARISSA BALLARD : Hadii aad ku hadasho Sojonathanali, waaxda luqadaha, qaybta kaalmada adeegyada, waxnishant mackin anthony stevenson. So [facility] [phone].    ATENCIÓN: Si habla español, tiene a mirza disposición servicios gratuitos de asistencia lingüística. Llame al [phone].    We comply with applicable federal civil rights laws and [place] laws. We do not discriminate on the basis of race, color, national origin, age, disability, sex, sexual orientation, or gender identity.            Thank you!     Thank you for choosing [facility]  for your care. Our goal is always to provide you with excellent care. Hearing back from our patients is one way we can continue to improve our services. Please take a few minutes to complete the written survey that you may receive in the mail after your visit with us. Thank you!             Your Updated Medication List - Protect others around you: Learn how to safely use, store and throw away your medicines at www.disposemymeds.org.          This list is accurate as of 10/2/18  8:33 AM.  Always use your most recent med list.                   Brand Name Dispense Instructions for use Diagnosis    amLODIPine 5 MG tablet    NORVASC    90 tablet    Take 1 tablet (5 mg) by mouth daily    Essential hypertension       anastrozole 1 MG tablet    ARIMIDEX    90  tablet    Take 1 tablet by mouth daily.        ascorbic acid 1000 MG Tabs    vitamin C     Take 1,000 mg by mouth daily.        atenolol 100 MG tablet    TENORMIN    90 tablet    TAKE 1 TABLET (100 MG) BY MOUTH DAILY    Essential hypertension       atorvastatin 10 MG tablet    LIPITOR    90 tablet    Take 1 tablet (10 mg) by mouth daily    Hyperlipidemia LDL goal <100       cholecalciferol 5000 units Caps      Take  by mouth.        hydrochlorothiazide 25 MG tablet    HYDRODIURIL    90 tablet    Take 1 tablet (25 mg) by mouth daily    Essential hypertension       MULTIVITAMIN PO      Take 1 tablet by mouth daily.

## 2018-10-02 NOTE — NURSING NOTE
"Initial /79 (BP Location: Right arm, Patient Position: Sitting, Cuff Size: Adult Regular)  Pulse 72  SpO2 99% Estimated body mass index is 26.6 kg/(m^2) as calculated from the following:    Height as of 8/23/18: 1.581 m (5' 2.25\").    Weight as of 8/23/18: 66.5 kg (146 lb 9.6 oz). .      "

## 2018-10-04 LAB — COPATH REPORT: NORMAL

## 2019-02-08 ENCOUNTER — OFFICE VISIT (OUTPATIENT)
Dept: FAMILY MEDICINE | Facility: CLINIC | Age: 59
End: 2019-02-08
Payer: COMMERCIAL

## 2019-02-08 VITALS
DIASTOLIC BLOOD PRESSURE: 76 MMHG | BODY MASS INDEX: 27.27 KG/M2 | WEIGHT: 148.2 LBS | SYSTOLIC BLOOD PRESSURE: 132 MMHG | HEIGHT: 62 IN | TEMPERATURE: 96.3 F | HEART RATE: 62 BPM | OXYGEN SATURATION: 98 %

## 2019-02-08 DIAGNOSIS — M79.675 PAIN OF TOE OF LEFT FOOT: Primary | ICD-10-CM

## 2019-02-08 PROCEDURE — 99213 OFFICE O/P EST LOW 20 MIN: CPT | Performed by: NURSE PRACTITIONER

## 2019-02-08 RX ORDER — INDOMETHACIN 50 MG/1
50 CAPSULE ORAL 2 TIMES DAILY WITH MEALS
Qty: 14 CAPSULE | Refills: 1 | Status: SHIPPED | OUTPATIENT
Start: 2019-02-08 | End: 2020-07-22

## 2019-02-08 ASSESSMENT — MIFFLIN-ST. JEOR: SCORE: 1204.45

## 2019-02-08 NOTE — PATIENT INSTRUCTIONS
Possible arthritis vs gout  Try Indocin 50 mg twice daily with meals for 7 days   If no improvement will consider podiatry consult

## 2019-02-08 NOTE — PROGRESS NOTES
"  SUBJECTIVE:   Ashley Coomsb is a 58 year old female who presents to clinic today for the following health issues:      Joint Pain    Onset: 3 days     Description:   Location: Left foot, fourth toe   Character: constant dull ache, walking makes it worse     Intensity: no pain right now, walking 7/10     Progression of Symptoms: worse    Accompanying Signs & Symptoms:  Other symptoms: swelling    History:   Previous similar pain: no       Precipitating factors:   Trauma or overuse: no     Alleviating factors:  Improved by: nothing    Therapies Tried and outcome: Lotrimin cream, soaked it in water     Problem list and histories reviewed & adjusted, as indicated.  Additional history: as documented    Labs reviewed in EPIC    Reviewed and updated as needed this visit by clinical staff       Reviewed and updated as needed this visit by Provider         ROS:  Constitutional, HEENT, cardiovascular, pulmonary, gi and gu systems are negative, except as otherwise noted.    OBJECTIVE:     /76 (BP Location: Right arm, Patient Position: Sitting, Cuff Size: Adult Regular)   Pulse 62   Temp 96.3  F (35.7  C) (Tympanic)   Ht 1.581 m (5' 2.25\")   Wt 67.2 kg (148 lb 3.2 oz)   SpO2 98%   BMI 26.89 kg/m    Body mass index is 26.89 kg/m .  GENERAL: healthy, alert and no distress  MS: left 4 th toe slightly edematous, there is no erythema, no signs of infection, full ROM, nail intact. The rest of the left foot exam is normal.    SKIN: no suspicious lesions or rashes    Diagnostic Test Results:  none     ASSESSMENT/PLAN:     1. Pain of toe of left foot  -possible gout vs osteoarthritis  -will try Indocin for 1 week, avoid activities that aggravate pain. If no improvement follow up with podiatrist   - indomethacin (INDOCIN) 50 MG capsule; Take 1 capsule (50 mg) by mouth 2 times daily (with meals) for 7 days  Dispense: 14 capsule; Refill: 1    See Patient Instructions    CAROLA Vargas [facility]  "

## 2019-08-18 DIAGNOSIS — I10 ESSENTIAL HYPERTENSION: ICD-10-CM

## 2019-08-18 NOTE — LETTER
[facility]  [address] [address]  [phone]        August 20, 2019  Ashley Coombs  [address] [address]    Dear Ashley,    I care about your health and have reviewed your health plan. I have reviewed your medical conditions, medication list, and lab results and am making recommendations based on this review, to better manage your health.    You are in particular need of attention regarding:  -Wellness (Physical) Visit  on or around 8/23/19.    I am recommending that you:  -schedule a WELLNESS (Physical) APPOINTMENT with me.   I will check fasting labs the same day - nothing to eat except water and meds for 8-10 hours prior.    Here is a list of Health Maintenance topics that are due now or due soon:  Health Maintenance Due   Topic Date Due     ADVANCE CARE PLANNING  1960     HIV SCREENING  09/01/1974     ZOSTER IMMUNIZATION (1 of 2) 09/01/2009     TOBACCO CESSATION COUNSELING  01/14/2016     PHQ-2  01/01/2019     PREVENTIVE CARE VISIT  08/23/2019     FIT  09/08/2019   Please call us at [phone] (or use [facility]) to address the above recommendations.     Thank you for trusting [facility] and we appreciate the opportunity to serve you.  We look forward to supporting your healthcare needs in the future.    Healthy Regards,    CAROLA Vargas CNP/Lena SANTACRUZ RN

## 2019-08-19 NOTE — TELEPHONE ENCOUNTER
"Requested Prescriptions   Pending Prescriptions Disp Refills     hydrochlorothiazide (HYDRODIURIL) 25 MG tablet [Pharmacy Med Name: hydroCHLOROthiazide Oral Tablet 25 MG] 90 tablet 2     Sig: Take 1 tablet (25 mg) by mouth daily   Last Written Prescription Date:  8/23/18  Last Fill Quantity: 90 tab,  # refills: 3   Last office visit: 2/8/2019 with prescribing provider:  Cheryel Castellon Office Visit:        Diuretics (Including Combos) Protocol Passed - 8/18/2019  7:01 AM        Passed - Blood pressure under 140/90 in past 12 months     BP Readings from Last 3 Encounters:   02/08/19 132/76   10/02/18 147/79   08/23/18 136/84                 Passed - Recent (12 mo) or future (30 days) visit within the authorizing provider's specialty     Patient had office visit in the last 12 months or has a visit in the next 30 days with authorizing provider or within the authorizing provider's specialty.  See \"Patient Info\" tab in inbasket, or \"Choose Columns\" in Meds & Orders section of the refill encounter.              Passed - Medication is active on med list        Passed - Patient is age 18 or older        Passed - No active pregancy on record        Passed - Normal serum creatinine on file in past 12 months     Recent Labs   Lab Test 08/23/18  0836   CR 0.63              Passed - Normal serum potassium on file in past 12 months     Recent Labs   Lab Test 08/23/18  0836   POTASSIUM 3.9                    Passed - Normal serum sodium on file in past 12 months     Recent Labs   Lab Test 08/23/18  0836                 Passed - No positive pregnancy test in past 12 months        atenolol (TENORMIN) 100 MG tablet [Pharmacy Med Name: Atenolol Oral Tablet 100 MG] 90 tablet 2     Sig: TAKE 1 TABLET (100 MG) BY MOUTH DAILY   Last Written Prescription Date:  8/23/18  Last Fill Quantity: 90 tab,  # refills: 3   Last office visit: 2/8/2019 with prescribing provider:  Cheryle Castellon Office Visit: " "       Beta-Blockers Protocol Passed - 8/18/2019  7:01 AM        Passed - Blood pressure under 140/90 in past 12 months     BP Readings from Last 3 Encounters:   02/08/19 132/76   10/02/18 147/79   08/23/18 136/84                 Passed - Patient is age 6 or older        Passed - Recent (12 mo) or future (30 days) visit within the authorizing provider's specialty     Patient had office visit in the last 12 months or has a visit in the next 30 days with authorizing provider or within the authorizing provider's specialty.  See \"Patient Info\" tab in inbasket, or \"Choose Columns\" in Meds & Orders section of the refill encounter.              Passed - Medication is active on med list        amLODIPine (NORVASC) 5 MG tablet [Pharmacy Med Name: amLODIPine Besylate Oral Tablet 5 MG] 90 tablet 2     Sig: Take 1 tablet (5 mg) by mouth daily   Last Written Prescription Date:  8/23/18  Last Fill Quantity: 90 tab,  # refills: 3   Last office visit: 2/8/2019 with prescribing provider:  Cheryle Castellon     Future Office Visit:        Calcium Channel Blockers Protocol  Passed - 8/18/2019  7:01 AM        Passed - Blood pressure under 140/90 in past 12 months     BP Readings from Last 3 Encounters:   02/08/19 132/76   10/02/18 147/79   08/23/18 136/84                 Passed - Recent (12 mo) or future (30 days) visit within the authorizing provider's specialty     Patient had office visit in the last 12 months or has a visit in the next 30 days with authorizing provider or within the authorizing provider's specialty.  See \"Patient Info\" tab in inbasket, or \"Choose Columns\" in Meds & Orders section of the refill encounter.              Passed - Medication is active on med list        Passed - Patient is age 18 or older        Passed - No active pregnancy on record        Passed - Normal serum creatinine on file in past 12 months     Recent Labs   Lab Test 08/23/18  0836   CR 0.63             Passed - No positive pregnancy test " in past 12 months

## 2019-08-20 RX ORDER — HYDROCHLOROTHIAZIDE 25 MG/1
25 TABLET ORAL DAILY
Qty: 30 TABLET | Refills: 0 | Status: SHIPPED | OUTPATIENT
Start: 2019-08-20 | End: 2019-08-23

## 2019-08-20 RX ORDER — ATENOLOL 100 MG/1
TABLET ORAL
Qty: 30 TABLET | Refills: 0 | Status: SHIPPED | OUTPATIENT
Start: 2019-08-20 | End: 2019-08-23

## 2019-08-20 RX ORDER — AMLODIPINE BESYLATE 5 MG/1
5 TABLET ORAL DAILY
Qty: 30 TABLET | Refills: 0 | Status: SHIPPED | OUTPATIENT
Start: 2019-08-20 | End: 2019-08-23

## 2019-08-20 NOTE — TELEPHONE ENCOUNTER
Medications are being filled for 1 time refill only due to:  Patient needs to be seen because Due for Physical and labs around 8/23/19. Letter mailed..   Lena SANTACRUZ RN

## 2019-08-22 ENCOUNTER — TRANSFERRED RECORDS (OUTPATIENT)
Dept: HEALTH INFORMATION MANAGEMENT | Facility: CLINIC | Age: 59
End: 2019-08-22

## 2019-08-22 NOTE — PROGRESS NOTES
SUBJECTIVE:   CC: Ashley Coombs is an 58 year old woman who presents for preventive health visit.     Chief Complaint   Patient presents with     Physical     Blood Draw     Fasting for labs      Refill Request     BP meds, pending      Recheck Medication     Lipitor, wants to know if she needs to be taking this, she never started taking this last year        Healthy Habits:    Do you get at least three servings of calcium containing foods daily (dairy, green leafy vegetables, etc.)? yes    Amount of exercise or daily activities, outside of work: None     Problems taking medications regularly No    Medication side effects: No    Have you had an eye exam in the past two years? yes    Do you see a dentist twice per year? yes    Do you have sleep apnea, excessive snoring or daytime drowsiness?no      Today's PHQ-2 Score:   PHQ-2 ( 1999 [employer]) 8/23/2018 6/9/2017   Q1: Little interest or pleasure in doing things 0 0   Q2: Feeling down, depressed or hopeless 0 0   PHQ-2 Score 0 0   Q1: Little interest or pleasure in doing things - -   Q2: Feeling down, depressed or hopeless - -   PHQ-2 Score - -       Abuse: Current or Past(Physical, Sexual or Emotional)- No  Do you feel safe in your environment? Yes    Social History     Tobacco Use     Smoking status: Current Every Day Smoker     Packs/day: 0.50     Years: 30.00     Pack years: 15.00     Types: Cigarettes     Smokeless tobacco: Never Used     Tobacco comment: cutting back and trying to quit   Substance Use Topics     Alcohol use: Yes     Alcohol/week: 0.0 oz     Comment: 5 beers per month     If you drink alcohol do you typically have >3 drinks per day or >7 drinks per week? No                     Reviewed orders with patient.  Reviewed health maintenance and updated orders accordingly - Yes    Mammogram Screening: Patient over age 50, mutual decision to screen reflected in health maintenance.    Pertinent mammograms are reviewed under the imaging tab.  History of  abnormal Pap smear: NO - age 30- 65 PAP every 3 years recommended  PAP / HPV Latest Ref Rng & Units 6/9/2017 8/13/2012 8/5/2011   PAP - NIL NIL NIL   HPV 16 DNA NEG Negative - -   HPV 18 DNA NEG Negative - -   OTHER HR HPV NEG Negative - -     Reviewed and updated as needed this visit by clinical staff         Reviewed and updated as needed this visit by Provider            ROS:  CONSTITUTIONAL: NEGATIVE for fever, chills, change in weight  INTEGUMENTARU/SKIN: NEGATIVE for worrisome rashes, moles or lesions  EYES: NEGATIVE for vision changes or irritation  ENT: NEGATIVE for ear, mouth and throat problems  RESP: NEGATIVE for significant cough or SOB  BREAST: NEGATIVE for masses, tenderness or discharge  CV: NEGATIVE for chest pain, palpitations or peripheral edema  GI: NEGATIVE for nausea, abdominal pain, heartburn, or change in bowel habits  : NEGATIVE for unusual urinary or vaginal symptoms. Periods are regular.  MUSCULOSKELETAL: NEGATIVE for significant arthralgias or myalgia  NEURO: NEGATIVE for weakness, dizziness or paresthesias  PSYCHIATRIC: NEGATIVE for changes in mood or affect    OBJECTIVE:   There were no vitals taken for this visit.  EXAM:  GENERAL: healthy, alert and no distress  EYES: Eyes grossly normal to inspection, PERRL and conjunctivae and sclerae normal  HENT: ear canals and TM's normal, nose and mouth without ulcers or lesions  NECK: no adenopathy, no asymmetry, masses, or scars and thyroid normal to palpation  RESP: lungs clear to auscultation - no rales, rhonchi or wheezes  CV: regular rate and rhythm, normal S1 S2, no S3 or S4, no murmur, click or rub, no peripheral edema and peripheral pulses strong  ABDOMEN: soft, nontender, no hepatosplenomegaly, no masses and bowel sounds normal  MS: no gross musculoskeletal defects noted, no edema  SKIN: no suspicious lesions or rashes  NEURO: Normal strength and tone, mentation intact and speech normal  PSYCH: mentation appears normal, affect  normal/bright    Diagnostic Test Results:  Labs reviewed in Epic  Results for orders placed or performed in visit on 08/23/19 (from the past 24 hour(s))   Lipid panel reflex to direct LDL Fasting   Result Value Ref Range    Cholesterol 228 (H) <200 mg/dL    Triglycerides 274 (H) <150 mg/dL    HDL Cholesterol 38 (L) >49 mg/dL    LDL Cholesterol Calculated 135 (H) <100 mg/dL    Non HDL Cholesterol 190 (H) <130 mg/dL   Basic metabolic panel   Result Value Ref Range    Sodium 135 133 - 144 mmol/L    Potassium 3.8 3.4 - 5.3 mmol/L    Chloride 104 94 - 109 mmol/L    Carbon Dioxide 26 20 - 32 mmol/L    Anion Gap 5 3 - 14 mmol/L    Glucose 126 (H) 70 - 99 mg/dL    Urea Nitrogen 17 7 - 30 mg/dL    Creatinine 0.60 0.52 - 1.04 mg/dL    GFR Estimate >90 >60 mL/min/[1.73_m2]    GFR Estimate If Black >90 >60 mL/min/[1.73_m2]    Calcium 9.5 8.5 - 10.1 mg/dL       ASSESSMENT/PLAN:   1. Routine general medical examination at a health care facility    - Lipid panel reflex to direct LDL Fasting  -LDL slightly elevated, recommended life style changes, follow low fat diet and exercise daily, recheck lipid panel in 1 year.   -scheduled mammogram on Wednesday next week     2. Essential hypertension  -well controlled, refills provided   - amLODIPine (NORVASC) 5 MG tablet; Take 1 tablet (5 mg) by mouth daily  Dispense: 90 tablet; Refill: 3  - atenolol (TENORMIN) 100 MG tablet; Take 1 tablet (100 mg) by mouth daily  Dispense: 90 tablet; Refill: 3  - hydrochlorothiazide (HYDRODIURIL) 25 MG tablet; Take 1 tablet (25 mg) by mouth daily  Dispense: 90 tablet; Refill: 3  - Basic metabolic panel    3. Special screening for malignant neoplasms, colon    - Fecal colorectal cancer screen (FIT); Future    COUNSELING:   Reviewed preventive health counseling, as reflected in patient instructions       Regular exercise       Healthy diet/nutrition    Estimated body mass index is 26.89 kg/m  as calculated from the following:    Height as of 2/8/19: 1.581 m  "(5' 2.25\").    Weight as of 2/8/19: 67.2 kg (148 lb 3.2 oz).    Weight management plan: Discussed healthy diet and exercise guidelines      Counseling Resources:  ATP IV Guidelines  Pooled Cohorts Equation Calculator  Breast Cancer Risk Calculator  FRAX Risk Assessment  ICSI Preventive Guidelines  Dietary Guidelines for Americans, 2010  USDA's MyPlate  ASA Prophylaxis  Lung CA Screening    CAROLA Vargas [facility]  "

## 2019-08-22 NOTE — PATIENT INSTRUCTIONS

## 2019-08-23 ENCOUNTER — OFFICE VISIT (OUTPATIENT)
Dept: FAMILY MEDICINE | Facility: CLINIC | Age: 59
End: 2019-08-23
Payer: COMMERCIAL

## 2019-08-23 VITALS
TEMPERATURE: 97.3 F | OXYGEN SATURATION: 98 % | SYSTOLIC BLOOD PRESSURE: 138 MMHG | RESPIRATION RATE: 16 BRPM | WEIGHT: 151.4 LBS | DIASTOLIC BLOOD PRESSURE: 70 MMHG | BODY MASS INDEX: 27.86 KG/M2 | HEIGHT: 62 IN | HEART RATE: 57 BPM

## 2019-08-23 DIAGNOSIS — I10 ESSENTIAL HYPERTENSION: ICD-10-CM

## 2019-08-23 DIAGNOSIS — Z00.00 ROUTINE GENERAL MEDICAL EXAMINATION AT A HEALTH CARE FACILITY: Primary | ICD-10-CM

## 2019-08-23 DIAGNOSIS — Z12.11 SPECIAL SCREENING FOR MALIGNANT NEOPLASMS, COLON: ICD-10-CM

## 2019-08-23 LAB
ANION GAP SERPL CALCULATED.3IONS-SCNC: 5 MMOL/L (ref 3–14)
BUN SERPL-MCNC: 17 MG/DL (ref 7–30)
CALCIUM SERPL-MCNC: 9.5 MG/DL (ref 8.5–10.1)
CHLORIDE SERPL-SCNC: 104 MMOL/L (ref 94–109)
CHOLEST SERPL-MCNC: 228 MG/DL
CO2 SERPL-SCNC: 26 MMOL/L (ref 20–32)
CREAT SERPL-MCNC: 0.6 MG/DL (ref 0.52–1.04)
GFR SERPL CREATININE-BSD FRML MDRD: >90 ML/MIN/{1.73_M2}
GLUCOSE SERPL-MCNC: 126 MG/DL (ref 70–99)
HDLC SERPL-MCNC: 38 MG/DL
LDLC SERPL CALC-MCNC: 135 MG/DL
NONHDLC SERPL-MCNC: 190 MG/DL
POTASSIUM SERPL-SCNC: 3.8 MMOL/L (ref 3.4–5.3)
SODIUM SERPL-SCNC: 135 MMOL/L (ref 133–144)
TRIGL SERPL-MCNC: 274 MG/DL

## 2019-08-23 PROCEDURE — 99396 PREV VISIT EST AGE 40-64: CPT | Performed by: NURSE PRACTITIONER

## 2019-08-23 PROCEDURE — 80048 BASIC METABOLIC PNL TOTAL CA: CPT | Performed by: NURSE PRACTITIONER

## 2019-08-23 PROCEDURE — 99213 OFFICE O/P EST LOW 20 MIN: CPT | Mod: 25 | Performed by: NURSE PRACTITIONER

## 2019-08-23 PROCEDURE — 80061 LIPID PANEL: CPT | Performed by: NURSE PRACTITIONER

## 2019-08-23 PROCEDURE — 36415 COLL VENOUS BLD VENIPUNCTURE: CPT | Performed by: NURSE PRACTITIONER

## 2019-08-23 RX ORDER — ATENOLOL 100 MG/1
100 TABLET ORAL DAILY
Qty: 90 TABLET | Refills: 3 | Status: SHIPPED | OUTPATIENT
Start: 2019-08-23 | End: 2020-07-22

## 2019-08-23 RX ORDER — HYDROCHLOROTHIAZIDE 25 MG/1
25 TABLET ORAL DAILY
Qty: 90 TABLET | Refills: 3 | Status: SHIPPED | OUTPATIENT
Start: 2019-08-23 | End: 2020-07-22

## 2019-08-23 RX ORDER — AMLODIPINE BESYLATE 5 MG/1
5 TABLET ORAL DAILY
Qty: 90 TABLET | Refills: 3 | Status: SHIPPED | OUTPATIENT
Start: 2019-08-23 | End: 2020-07-22

## 2019-08-23 ASSESSMENT — MIFFLIN-ST. JEOR: SCORE: 1218.97

## 2019-08-28 ENCOUNTER — HOSPITAL ENCOUNTER (OUTPATIENT)
Dept: MAMMOGRAPHY | Facility: CLINIC | Age: 59
Discharge: HOME | End: 2019-08-28
Attending: NURSE PRACTITIONER | Admitting: NURSE PRACTITIONER
Payer: COMMERCIAL

## 2019-08-28 DIAGNOSIS — Z12.31 VISIT FOR SCREENING MAMMOGRAM: ICD-10-CM

## 2019-08-28 PROCEDURE — 77063 BREAST TOMOSYNTHESIS BI: CPT

## 2019-11-05 ENCOUNTER — HEALTH MAINTENANCE LETTER (OUTPATIENT)
Age: 59
End: 2019-11-05

## 2020-01-17 ENCOUNTER — TELEPHONE (OUTPATIENT)
Dept: FAMILY MEDICINE | Facility: CLINIC | Age: 60
End: 2020-01-17

## 2020-01-17 NOTE — TELEPHONE ENCOUNTER
Panel Management Review      Patient has the following on her problem list: None      Composite cancer screening  Chart review shows that this patient is due/due soon for the following Colonoscopy and Fecal Colorectal (FIT)  Summary:    Patient is due/failing the following:   COLONOSCOPY and FIT    Action needed:   Patient needs referral/order: fit test vs colonoscopy     Type of outreach:    Sent letter.    Questions for provider review:    None                                                                                                                                    Bridgett Hall

## 2020-01-17 NOTE — LETTER
January 17, 2020      Ashley Coombs  [address] [address]          Dear Ashley Coombs,     At [facility] we care about your health and are committed to providing quality patient care, which includes staying current on preventative cancer screenings.  You can increase your chances of finding and treating cancers through regular screenings.      Our records show that you are due for the following screening(s):      Colonoscopy for colon cancer - Call [phone] to schedule   Recommended every ten years for everyone age 50 and older  Please take a moment to read over the enclosed information packet about colon cancer screening.   We strongly urge our patient's to consider having a colonoscopy done, which is the best screening test available and only needs to be done every 10 years if normal.      If you have a My-Chart Account, you also can schedule this appointment through there.    If you have already had one or all of the above screening tests at another facility, please call us so that we may update your chart.      Your partners in health,      Quality Committee   [facility]

## 2020-06-01 ENCOUNTER — MYC MEDICAL ADVICE (OUTPATIENT)
Dept: FAMILY MEDICINE | Facility: CLINIC | Age: 60
End: 2020-06-01

## 2020-06-01 ENCOUNTER — E-VISIT (OUTPATIENT)
Dept: FAMILY MEDICINE | Facility: CLINIC | Age: 60
End: 2020-06-01
Payer: COMMERCIAL

## 2020-06-01 DIAGNOSIS — L23.7 CONTACT DERMATITIS DUE TO POISON IVY: Primary | ICD-10-CM

## 2020-06-01 PROCEDURE — 99421 OL DIG E/M SVC 5-10 MIN: CPT | Performed by: NURSE PRACTITIONER

## 2020-06-02 ENCOUNTER — MYC MEDICAL ADVICE (OUTPATIENT)
Dept: FAMILY MEDICINE | Facility: CLINIC | Age: 60
End: 2020-06-02

## 2020-06-02 DIAGNOSIS — H01.001 BLEPHARITIS OF RIGHT UPPER EYELID, UNSPECIFIED TYPE: Primary | ICD-10-CM

## 2020-06-02 RX ORDER — DESONIDE 0.5 MG/G
CREAM TOPICAL 2 TIMES DAILY
Qty: 15 G | Refills: 1 | Status: SHIPPED | OUTPATIENT
Start: 2020-06-02 | End: 2022-05-12

## 2020-06-02 RX ORDER — PREDNISONE 20 MG/1
20 TABLET ORAL 2 TIMES DAILY
Qty: 10 TABLET | Refills: 0 | Status: SHIPPED | OUTPATIENT
Start: 2020-06-02 | End: 2020-07-22

## 2020-06-03 RX ORDER — ERYTHROMYCIN 5 MG/G
0.5 OINTMENT OPHTHALMIC AT BEDTIME
Qty: 1 TUBE | Refills: 0 | Status: SHIPPED | OUTPATIENT
Start: 2020-06-03 | End: 2022-05-12

## 2020-07-22 ENCOUNTER — OFFICE VISIT (OUTPATIENT)
Dept: FAMILY MEDICINE | Facility: CLINIC | Age: 60
End: 2020-07-22
Payer: COMMERCIAL

## 2020-07-22 VITALS
RESPIRATION RATE: 16 BRPM | OXYGEN SATURATION: 98 % | WEIGHT: 142.2 LBS | HEART RATE: 58 BPM | HEIGHT: 61 IN | BODY MASS INDEX: 26.85 KG/M2 | TEMPERATURE: 96.8 F | SYSTOLIC BLOOD PRESSURE: 128 MMHG | DIASTOLIC BLOOD PRESSURE: 72 MMHG

## 2020-07-22 DIAGNOSIS — E78.5 HYPERLIPIDEMIA LDL GOAL <100: ICD-10-CM

## 2020-07-22 DIAGNOSIS — I10 ESSENTIAL HYPERTENSION: Primary | ICD-10-CM

## 2020-07-22 DIAGNOSIS — R42 DIZZINESS: ICD-10-CM

## 2020-07-22 DIAGNOSIS — Z12.11 SPECIAL SCREENING FOR MALIGNANT NEOPLASMS, COLON: ICD-10-CM

## 2020-07-22 DIAGNOSIS — R73.09 ELEVATED GLUCOSE LEVEL: ICD-10-CM

## 2020-07-22 DIAGNOSIS — D72.829 LEUKOCYTOSIS, UNSPECIFIED TYPE: ICD-10-CM

## 2020-07-22 LAB
ANION GAP SERPL CALCULATED.3IONS-SCNC: 5 MMOL/L (ref 3–14)
BUN SERPL-MCNC: 11 MG/DL (ref 7–30)
CALCIUM SERPL-MCNC: 9.2 MG/DL (ref 8.5–10.1)
CHLORIDE SERPL-SCNC: 104 MMOL/L (ref 94–109)
CHOLEST SERPL-MCNC: 229 MG/DL
CO2 SERPL-SCNC: 27 MMOL/L (ref 20–32)
CREAT SERPL-MCNC: 0.65 MG/DL (ref 0.52–1.04)
ERYTHROCYTE [DISTWIDTH] IN BLOOD BY AUTOMATED COUNT: 13.7 % (ref 10–15)
GFR SERPL CREATININE-BSD FRML MDRD: >90 ML/MIN/{1.73_M2}
GLUCOSE SERPL-MCNC: 108 MG/DL (ref 70–99)
HBA1C MFR BLD: 6.1 % (ref 0–5.6)
HCT VFR BLD AUTO: 48.1 % (ref 35–47)
HDLC SERPL-MCNC: 38 MG/DL
HGB BLD-MCNC: 16.2 G/DL (ref 11.7–15.7)
LDLC SERPL CALC-MCNC: 129 MG/DL
MCH RBC QN AUTO: 31.2 PG (ref 26.5–33)
MCHC RBC AUTO-ENTMCNC: 33.7 G/DL (ref 31.5–36.5)
MCV RBC AUTO: 93 FL (ref 78–100)
NONHDLC SERPL-MCNC: 191 MG/DL
PLATELET # BLD AUTO: 233 10E9/L (ref 150–450)
POTASSIUM SERPL-SCNC: 3.9 MMOL/L (ref 3.4–5.3)
RBC # BLD AUTO: 5.19 10E12/L (ref 3.8–5.2)
SODIUM SERPL-SCNC: 136 MMOL/L (ref 133–144)
TRIGL SERPL-MCNC: 311 MG/DL
WBC # BLD AUTO: 15.4 10E9/L (ref 4–11)

## 2020-07-22 PROCEDURE — 80048 BASIC METABOLIC PNL TOTAL CA: CPT | Performed by: NURSE PRACTITIONER

## 2020-07-22 PROCEDURE — 80061 LIPID PANEL: CPT | Performed by: NURSE PRACTITIONER

## 2020-07-22 PROCEDURE — 85027 COMPLETE CBC AUTOMATED: CPT | Performed by: NURSE PRACTITIONER

## 2020-07-22 PROCEDURE — 36415 COLL VENOUS BLD VENIPUNCTURE: CPT | Performed by: NURSE PRACTITIONER

## 2020-07-22 PROCEDURE — 82274 ASSAY TEST FOR BLOOD FECAL: CPT | Performed by: NURSE PRACTITIONER

## 2020-07-22 PROCEDURE — 99214 OFFICE O/P EST MOD 30 MIN: CPT | Performed by: NURSE PRACTITIONER

## 2020-07-22 PROCEDURE — 83036 HEMOGLOBIN GLYCOSYLATED A1C: CPT | Performed by: NURSE PRACTITIONER

## 2020-07-22 RX ORDER — HYDROCHLOROTHIAZIDE 25 MG/1
25 TABLET ORAL DAILY
Qty: 90 TABLET | Refills: 3 | Status: SHIPPED | OUTPATIENT
Start: 2020-07-22 | End: 2021-09-09

## 2020-07-22 RX ORDER — ATENOLOL 100 MG/1
100 TABLET ORAL DAILY
Qty: 90 TABLET | Refills: 3 | Status: SHIPPED | OUTPATIENT
Start: 2020-07-22 | End: 2021-09-09

## 2020-07-22 RX ORDER — AMLODIPINE BESYLATE 5 MG/1
5 TABLET ORAL DAILY
Qty: 90 TABLET | Refills: 3 | Status: SHIPPED | OUTPATIENT
Start: 2020-07-22 | End: 2021-09-09

## 2020-07-22 ASSESSMENT — MIFFLIN-ST. JEOR: SCORE: 1156.35

## 2020-07-22 NOTE — PATIENT INSTRUCTIONS
Will check for possible diabetes  Check electrolytes, kidney function, A1C blood count    Carotid US to check for possible carotid stenosis    Lipid panel

## 2020-07-22 NOTE — PROGRESS NOTES
"Subjective     Ashley Coombs is a 59 year old female who presents to clinic today for the following health issues:    HPI       Heaviness in legs       Duration: Started in May- was off and on, Started to get worse last Friday.     Description (location/character/radiation): She states her legs have been feeling heavy. Has been harder Occasionally feels lightheaded.  She has had a \"warm\" feeling throughout her body that happens when she gets stress from something.     Intensity:  moderate    Accompanying signs and symptoms: Has history of panic attacks, has under a lot of stress lately, her  passed away last year and she has a 10 acre property to take care and works a full time job. She does not feel that her symptoms are due to anxiety.     History (similar episodes/previous evaluation): None    Precipitating or alleviating factors: None    Therapies tried and outcome: None     Hypertension Follow-up      Do you check your blood pressure regularly outside of the clinic? Yes 135/ 86 yesterday     Are you following a low salt diet? No     Are your blood pressures ever more than 140 on the top number (systolic) OR more   than 90 on the bottom number (diastolic), for example 140/90? No    Reviewed and updated as needed this visit by Provider  Tobacco  Allergies  Meds  Problems  Med Hx  Surg Hx  Fam Hx         Review of Systems   Constitutional, HEENT, cardiovascular, pulmonary, gi and gu systems are negative, except as otherwise noted.      Objective    /72 (BP Location: Right arm, Patient Position: Chair, Cuff Size: Adult Regular)   Pulse 58   Temp 96.8  F (36  C) (Tympanic)   Resp 16   Ht 1.556 m (5' 1.25\")   Wt 64.5 kg (142 lb 3.2 oz)   LMP 06/27/2011   SpO2 98%   BMI 26.65 kg/m    Body mass index is 26.65 kg/m .  Physical Exam   GENERAL: healthy, alert and no distress  EYES: Eyes grossly normal to inspection, PERRL and conjunctivae and sclerae normal  HENT: ear canals and TM's normal, " nose and mouth without ulcers or lesions  NECK: no adenopathy, no asymmetry, masses, or scars and thyroid normal to palpation  RESP: lungs clear to auscultation - no rales, rhonchi or wheezes  CV: regular rate and rhythm, normal S1 S2, no S3 or S4, no murmur, click or rub, no peripheral edema and peripheral pulses strong  MS: no gross musculoskeletal defects noted, no edema  SKIN: no suspicious lesions or rashes  NEURO: Normal strength and tone, mentation intact and speech normal  PSYCH: mentation appears normal, affect normal/bright    Diagnostic Test Results:  Labs reviewed in Epic  Results for orders placed or performed in visit on 07/22/20   Basic metabolic panel  (Ca, Cl, CO2, Creat, Gluc, K, Na, BUN)     Status: Abnormal   Result Value Ref Range    Sodium 136 133 - 144 mmol/L    Potassium 3.9 3.4 - 5.3 mmol/L    Chloride 104 94 - 109 mmol/L    Carbon Dioxide 27 20 - 32 mmol/L    Anion Gap 5 3 - 14 mmol/L    Glucose 108 (H) 70 - 99 mg/dL    Urea Nitrogen 11 7 - 30 mg/dL    Creatinine 0.65 0.52 - 1.04 mg/dL    GFR Estimate >90 >60 mL/min/[1.73_m2]    GFR Estimate If Black >90 >60 mL/min/[1.73_m2]    Calcium 9.2 8.5 - 10.1 mg/dL   CBC with platelets     Status: Abnormal   Result Value Ref Range    WBC 15.4 (H) 4.0 - 11.0 10e9/L    RBC Count 5.19 3.8 - 5.2 10e12/L    Hemoglobin 16.2 (H) 11.7 - 15.7 g/dL    Hematocrit 48.1 (H) 35.0 - 47.0 %    MCV 93 78 - 100 fl    MCH 31.2 26.5 - 33.0 pg    MCHC 33.7 31.5 - 36.5 g/dL    RDW 13.7 10.0 - 15.0 %    Platelet Count 233 150 - 450 10e9/L   Hemoglobin A1c     Status: Abnormal   Result Value Ref Range    Hemoglobin A1C 6.1 (H) 0 - 5.6 %   Lipid panel reflex to direct LDL Fasting     Status: Abnormal   Result Value Ref Range    Cholesterol 229 (H) <200 mg/dL    Triglycerides 311 (H) <150 mg/dL    HDL Cholesterol 38 (L) >49 mg/dL    LDL Cholesterol Calculated 129 (H) <100 mg/dL    Non HDL Cholesterol 191 (H) <130 mg/dL           Assessment & Plan     1. Essential  "hypertension  -well controlled  -all labs reassuring except for mild leukocytosis, recommended to repeat CBC next week   - amLODIPine (NORVASC) 5 MG tablet; Take 1 tablet (5 mg) by mouth daily  Dispense: 90 tablet; Refill: 3  - atenolol (TENORMIN) 100 MG tablet; Take 1 tablet (100 mg) by mouth daily  Dispense: 90 tablet; Refill: 3  - hydrochlorothiazide (HYDRODIURIL) 25 MG tablet; Take 1 tablet (25 mg) by mouth daily  Dispense: 90 tablet; Refill: 3  - Basic metabolic panel  (Ca, Cl, CO2, Creat, Gluc, K, Na, BUN)  - Lipid panel reflex to direct LDL Fasting    2. Elevated glucose level  -prediabetic, recommended to follow diabetic diet  - Basic metabolic panel  (Ca, Cl, CO2, Creat, Gluc, K, Na, BUN)  - Hemoglobin A1c    3. Dizziness  -the patient having hard time describing her symptoms, states \"its all starts in my neck\", denies pain, complains of intermittent dizziness, which is not positional, ordered carotid US to rule out possible stenosis  - Basic metabolic panel  (Ca, Cl, CO2, Creat, Gluc, K, Na, BUN)  - CBC with platelets  - US Carotid Bilateral; Future    4. Special screening for malignant neoplasms, colon    - Fecal colorectal cancer screen (FIT); Future    5. Hyperlipidemia LDL goal <100    - atorvastatin (LIPITOR) 10 MG tablet; Take 1 tablet (10 mg) by mouth daily  Dispense: 90 tablet; Refill: 1    6. Leukocytosis, unspecified type  -unclear etiology, possibly due to smoking? no signs and symptoms of infection, will repeat CBC with dif next week   - CBC with platelets and differential; Future         See Patient Instructions    Return in about 3 days (around 7/25/2020) for Lab Work.    CAROLA Vargas [facility]  "

## 2020-07-23 ENCOUNTER — MYC MEDICAL ADVICE (OUTPATIENT)
Dept: FAMILY MEDICINE | Facility: CLINIC | Age: 60
End: 2020-07-23

## 2020-07-23 RX ORDER — ATORVASTATIN CALCIUM 10 MG/1
10 TABLET, FILM COATED ORAL DAILY
Qty: 90 TABLET | Refills: 1 | Status: SHIPPED | OUTPATIENT
Start: 2020-07-23 | End: 2021-01-21

## 2020-07-23 NOTE — TELEPHONE ENCOUNTER
See MyChart and updated pictures. Please note she sent one from today and one from June when it was worse.      MAXIME CarmonaN, RN

## 2020-07-26 LAB — HEMOCCULT STL QL IA: NEGATIVE

## 2020-07-29 ENCOUNTER — HOSPITAL ENCOUNTER (OUTPATIENT)
Dept: ULTRASOUND IMAGING | Facility: CLINIC | Age: 60
Discharge: HOME | End: 2020-07-29
Attending: NURSE PRACTITIONER | Admitting: NURSE PRACTITIONER
Payer: COMMERCIAL

## 2020-07-29 DIAGNOSIS — R42 DIZZINESS: ICD-10-CM

## 2020-07-29 PROCEDURE — 93880 EXTRACRANIAL BILAT STUDY: CPT

## 2020-07-31 DIAGNOSIS — D72.829 LEUKOCYTOSIS, UNSPECIFIED TYPE: ICD-10-CM

## 2020-07-31 LAB
BASOPHILS # BLD AUTO: 0 10E9/L (ref 0–0.2)
BASOPHILS NFR BLD AUTO: 0.3 %
DIFFERENTIAL METHOD BLD: ABNORMAL
EOSINOPHIL # BLD AUTO: 0.1 10E9/L (ref 0–0.7)
EOSINOPHIL NFR BLD AUTO: 1.2 %
ERYTHROCYTE [DISTWIDTH] IN BLOOD BY AUTOMATED COUNT: 13.2 % (ref 10–15)
HCT VFR BLD AUTO: 42.3 % (ref 35–47)
HGB BLD-MCNC: 14.7 G/DL (ref 11.7–15.7)
LYMPHOCYTES # BLD AUTO: 2.9 10E9/L (ref 0.8–5.3)
LYMPHOCYTES NFR BLD AUTO: 25.5 %
MCH RBC QN AUTO: 31.8 PG (ref 26.5–33)
MCHC RBC AUTO-ENTMCNC: 34.8 G/DL (ref 31.5–36.5)
MCV RBC AUTO: 92 FL (ref 78–100)
MONOCYTES # BLD AUTO: 1 10E9/L (ref 0–1.3)
MONOCYTES NFR BLD AUTO: 8.4 %
NEUTROPHILS # BLD AUTO: 7.3 10E9/L (ref 1.6–8.3)
NEUTROPHILS NFR BLD AUTO: 64.6 %
PLATELET # BLD AUTO: 234 10E9/L (ref 150–450)
RBC # BLD AUTO: 4.62 10E12/L (ref 3.8–5.2)
WBC # BLD AUTO: 11.3 10E9/L (ref 4–11)

## 2020-07-31 PROCEDURE — 85025 COMPLETE CBC W/AUTO DIFF WBC: CPT | Performed by: NURSE PRACTITIONER

## 2020-07-31 PROCEDURE — 36415 COLL VENOUS BLD VENIPUNCTURE: CPT | Performed by: NURSE PRACTITIONER

## 2020-08-31 ENCOUNTER — HOSPITAL ENCOUNTER (OUTPATIENT)
Dept: MAMMOGRAPHY | Facility: CLINIC | Age: 60
Discharge: HOME | End: 2020-08-31
Attending: NURSE PRACTITIONER | Admitting: NURSE PRACTITIONER
Payer: COMMERCIAL

## 2020-08-31 DIAGNOSIS — Z12.31 VISIT FOR SCREENING MAMMOGRAM: ICD-10-CM

## 2020-08-31 PROCEDURE — 77063 BREAST TOMOSYNTHESIS BI: CPT

## 2020-11-22 ENCOUNTER — HEALTH MAINTENANCE LETTER (OUTPATIENT)
Age: 60
End: 2020-11-22

## 2021-01-18 DIAGNOSIS — E78.5 HYPERLIPIDEMIA LDL GOAL <100: ICD-10-CM

## 2021-01-18 NOTE — LETTER
[facility]  [address] [id number]  Phone: [phone]    January 21, 2021       Ashley Coombs  [address] [address]            Dear Ashley:    We are concerned about your health care.  We recently provided you with medication refills.  Lab tests are required every year in order to continue refilling your statin.  Orders have been placed in our computer and should be accessible at most [facility]. Please complete this lab work as soon as possible. You will need to come fasting: no food or drink other than water or black coffee for 10 hours prior to lab appointment/blood draw.       Thank You,      Cheryle Castellon APRN CNP/ Lena SANTACRUZ RN, BSN

## 2021-01-18 NOTE — TELEPHONE ENCOUNTER
"Requested Prescriptions   Pending Prescriptions Disp Refills     atorvastatin (LIPITOR) 10 MG tablet [Pharmacy Med Name: Atorvastatin Calcium Oral Tablet 10 MG] 90 tablet 0     Sig: Take 1 tablet (10 mg) by mouth daily       Statins Protocol Passed - 1/18/2021  2:00 AM        Passed - LDL on file in past 12 months     Recent Labs   Lab Test 07/22/20  0806   *             Passed - No abnormal creatine kinase in past 12 months     No lab results found.             Passed - Recent (12 mo) or future (30 days) visit within the authorizing provider's specialty     Patient has had an office visit with the authorizing provider or a provider within the authorizing providers department within the previous 12 mos or has a future within next 30 days. See \"Patient Info\" tab in inbasket, or \"Choose Columns\" in Meds & Orders section of the refill encounter.              Passed - Medication is active on med list        Passed - Patient is age 18 or older        Passed - No active pregnancy on record        Passed - No positive pregnancy test in past 12 months             "

## 2021-01-21 RX ORDER — ATORVASTATIN CALCIUM 10 MG/1
10 TABLET, FILM COATED ORAL DAILY
Qty: 90 TABLET | Refills: 3 | Status: SHIPPED | OUTPATIENT
Start: 2021-01-21 | End: 2022-01-25

## 2021-01-21 NOTE — TELEPHONE ENCOUNTER
Routing refill request to provider for review/approval because:  Due for lipid recheck in Jan. Need labs ordered. Labs pended      Georgiana Zapata RN

## 2021-09-07 DIAGNOSIS — I10 ESSENTIAL HYPERTENSION: ICD-10-CM

## 2021-09-09 RX ORDER — ATENOLOL 100 MG/1
100 TABLET ORAL DAILY
Qty: 90 TABLET | Refills: 0 | Status: SHIPPED | OUTPATIENT
Start: 2021-09-09 | End: 2021-12-08

## 2021-09-09 RX ORDER — HYDROCHLOROTHIAZIDE 25 MG/1
TABLET ORAL
Qty: 90 TABLET | Refills: 0 | Status: SHIPPED | OUTPATIENT
Start: 2021-09-09 | End: 2021-12-08

## 2021-09-09 RX ORDER — AMLODIPINE BESYLATE 5 MG/1
TABLET ORAL
Qty: 90 TABLET | Refills: 0 | Status: SHIPPED | OUTPATIENT
Start: 2021-09-09 | End: 2021-12-08

## 2021-09-19 ENCOUNTER — HEALTH MAINTENANCE LETTER (OUTPATIENT)
Age: 61
End: 2021-09-19

## 2021-12-07 DIAGNOSIS — I10 ESSENTIAL HYPERTENSION: ICD-10-CM

## 2021-12-08 RX ORDER — HYDROCHLOROTHIAZIDE 25 MG/1
25 TABLET ORAL DAILY
Qty: 90 TABLET | Refills: 0 | Status: SHIPPED | OUTPATIENT
Start: 2021-12-08 | End: 2022-04-21

## 2021-12-08 RX ORDER — AMLODIPINE BESYLATE 5 MG/1
TABLET ORAL
Qty: 90 TABLET | Refills: 0 | Status: SHIPPED | OUTPATIENT
Start: 2021-12-08 | End: 2022-04-21

## 2021-12-08 RX ORDER — ATENOLOL 100 MG/1
100 TABLET ORAL DAILY
Qty: 90 TABLET | Refills: 0 | Status: SHIPPED | OUTPATIENT
Start: 2021-12-08 | End: 2022-04-21

## 2021-12-08 NOTE — TELEPHONE ENCOUNTER
"Requested Prescriptions   Pending Prescriptions Disp Refills     amLODIPine (NORVASC) 5 MG tablet [Pharmacy Med Name: amLODIPine Besylate Oral Tablet 5 MG] 90 tablet 0     Sig: Take 1 tablet (5 mg) by mouth once daily       Calcium Channel Blockers Protocol  Failed - 12/7/2021  2:00 AM        Failed - Blood pressure under 140/90 in past 12 months     BP Readings from Last 3 Encounters:   07/22/20 128/72   08/23/19 138/70   02/08/19 132/76                 Failed - Recent (12 mo) or future (30 days) visit within the authorizing provider's specialty     Patient has had an office visit with the authorizing provider or a provider within the authorizing providers department within the previous 12 mos or has a future within next 30 days. See \"Patient Info\" tab in inbasket, or \"Choose Columns\" in Meds & Orders section of the refill encounter.              Failed - Normal serum creatinine on file in past 12 months     Recent Labs   Lab Test 07/22/20  0806   CR 0.65       Ok to refill medication if creatinine is low          Passed - Medication is active on med list        Passed - Patient is age 18 or older        Passed - No active pregnancy on record        Passed - No positive pregnancy test in past 12 months           hydrochlorothiazide (HYDRODIURIL) 25 MG tablet [Pharmacy Med Name: hydroCHLOROthiazide Oral Tablet 25 MG] 90 tablet 0     Sig: Take 1 tablet (25 mg) by mouth once daily       Diuretics (Including Combos) Protocol Failed - 12/7/2021  2:00 AM        Failed - Blood pressure under 140/90 in past 12 months     BP Readings from Last 3 Encounters:   07/22/20 128/72   08/23/19 138/70   02/08/19 132/76                 Failed - Recent (12 mo) or future (30 days) visit within the authorizing provider's specialty     Patient has had an office visit with the authorizing provider or a provider within the authorizing providers department within the previous 12 mos or has a future within next 30 days. See \"Patient Info\" " "tab in inbasket, or \"Choose Columns\" in Meds & Orders section of the refill encounter.              Failed - Normal serum creatinine on file in past 12 months     Recent Labs   Lab Test 07/22/20  0806   CR 0.65              Failed - Normal serum potassium on file in past 12 months     Recent Labs   Lab Test 07/22/20  0806   POTASSIUM 3.9                    Failed - Normal serum sodium on file in past 12 months     Recent Labs   Lab Test 07/22/20  0806                 Passed - Medication is active on med list        Passed - Patient is age 18 or older        Passed - No active pregancy on record        Passed - No positive pregnancy test in past 12 months           atenolol (TENORMIN) 100 MG tablet [Pharmacy Med Name: Atenolol Oral Tablet 100 MG] 90 tablet 0     Sig: Take 1 tablet (100 mg) by mouth daily. Follow up for future refills       Beta-Blockers Protocol Failed - 12/7/2021  2:00 AM        Failed - Blood pressure under 140/90 in past 12 months     BP Readings from Last 3 Encounters:   07/22/20 128/72   08/23/19 138/70   02/08/19 132/76                 Failed - Recent (12 mo) or future (30 days) visit within the authorizing provider's specialty     Patient has had an office visit with the authorizing provider or a provider within the authorizing providers department within the previous 12 mos or has a future within next 30 days. See \"Patient Info\" tab in inbasket, or \"Choose Columns\" in Meds & Orders section of the refill encounter.              Passed - Patient is age 6 or older        Passed - Medication is active on med list             "

## 2022-01-09 ENCOUNTER — HEALTH MAINTENANCE LETTER (OUTPATIENT)
Age: 62
End: 2022-01-09

## 2022-01-25 DIAGNOSIS — E78.5 HYPERLIPIDEMIA LDL GOAL <100: ICD-10-CM

## 2022-01-25 RX ORDER — ATORVASTATIN CALCIUM 10 MG/1
10 TABLET, FILM COATED ORAL DAILY
Qty: 90 TABLET | Refills: 0 | Status: SHIPPED | OUTPATIENT
Start: 2022-01-25 | End: 2022-05-12

## 2022-01-25 NOTE — TELEPHONE ENCOUNTER
Routing refill request to provider for review/approval because:  Labs not current:  lipid  Patient needs to be seen because it has been more than 1 year since last office visit.

## 2022-04-21 DIAGNOSIS — I10 ESSENTIAL HYPERTENSION: ICD-10-CM

## 2022-04-21 RX ORDER — AMLODIPINE BESYLATE 5 MG/1
TABLET ORAL
Qty: 30 TABLET | Refills: 0 | Status: SHIPPED | OUTPATIENT
Start: 2022-04-21 | End: 2022-05-12

## 2022-04-21 RX ORDER — ATENOLOL 100 MG/1
100 TABLET ORAL DAILY
Qty: 30 TABLET | Refills: 0 | Status: SHIPPED | OUTPATIENT
Start: 2022-04-21 | End: 2022-05-12

## 2022-04-21 RX ORDER — HYDROCHLOROTHIAZIDE 25 MG/1
25 TABLET ORAL DAILY
Qty: 30 TABLET | Refills: 0 | Status: SHIPPED | OUTPATIENT
Start: 2022-04-21 | End: 2022-05-12

## 2022-04-21 NOTE — TELEPHONE ENCOUNTER
"Requested Prescriptions   Pending Prescriptions Disp Refills     atenolol (TENORMIN) 100 MG tablet 90 tablet 0     Sig: Take 1 tablet (100 mg) by mouth daily Follow up for future refills       Beta-Blockers Protocol Failed - 4/21/2022  9:58 AM        Failed - Blood pressure under 140/90 in past 12 months     BP Readings from Last 3 Encounters:   07/22/20 128/72   08/23/19 138/70   02/08/19 132/76                 Passed - Patient is age 6 or older        Passed - Recent (12 mo) or future (30 days) visit within the authorizing provider's specialty     Patient has had an office visit with the authorizing provider or a provider within the authorizing providers department within the previous 12 mos or has a future within next 30 days. See \"Patient Info\" tab in inbasket, or \"Choose Columns\" in Meds & Orders section of the refill encounter.              Passed - Medication is active on med list           amLODIPine (NORVASC) 5 MG tablet 90 tablet 0     Sig: Take 1 tablet (5 mg) by mouth once daily       Calcium Channel Blockers Protocol  Failed - 4/21/2022  9:58 AM        Failed - Blood pressure under 140/90 in past 12 months     BP Readings from Last 3 Encounters:   07/22/20 128/72   08/23/19 138/70   02/08/19 132/76                 Failed - Normal serum creatinine on file in past 12 months     Recent Labs   Lab Test 07/22/20  0806   CR 0.65       Ok to refill medication if creatinine is low          Passed - Recent (12 mo) or future (30 days) visit within the authorizing provider's specialty     Patient has had an office visit with the authorizing provider or a provider within the authorizing providers department within the previous 12 mos or has a future within next 30 days. See \"Patient Info\" tab in inbasket, or \"Choose Columns\" in Meds & Orders section of the refill encounter.              Passed - Medication is active on med list        Passed - Patient is age 18 or older        Passed - No active pregnancy on record " "       Passed - No positive pregnancy test in past 12 months           hydrochlorothiazide (HYDRODIURIL) 25 MG tablet 90 tablet 0     Sig: Take 1 tablet (25 mg) by mouth daily Follow up for future refills       Diuretics (Including Combos) Protocol Failed - 4/21/2022  9:58 AM        Failed - Blood pressure under 140/90 in past 12 months     BP Readings from Last 3 Encounters:   07/22/20 128/72   08/23/19 138/70   02/08/19 132/76                 Failed - Normal serum creatinine on file in past 12 months     Recent Labs   Lab Test 07/22/20  0806   CR 0.65              Failed - Normal serum potassium on file in past 12 months     Recent Labs   Lab Test 07/22/20  0806   POTASSIUM 3.9                    Failed - Normal serum sodium on file in past 12 months     Recent Labs   Lab Test 07/22/20  0806                 Passed - Recent (12 mo) or future (30 days) visit within the authorizing provider's specialty     Patient has had an office visit with the authorizing provider or a provider within the authorizing providers department within the previous 12 mos or has a future within next 30 days. See \"Patient Info\" tab in inbasket, or \"Choose Columns\" in Meds & Orders section of the refill encounter.              Passed - Medication is active on med list        Passed - Patient is age 18 or older        Passed - No active pregancy on record        Passed - No positive pregnancy test in past 12 months             "

## 2022-04-21 NOTE — TELEPHONE ENCOUNTER
Pending Prescriptions:                       Disp   Refills    atenolol (TENORMIN) 100 MG tablet         90 tab*0            Sig: Take 1 tablet (100 mg) by mouth daily Follow up           for future refills    amLODIPine (NORVASC) 5 MG tablet          90 tab*0            Sig: Take 1 tablet (5 mg) by mouth once daily    hydrochlorothiazide (HYDRODIURIL) 25 MG t*90 tab*0            Sig: Take 1 tablet (25 mg) by mouth daily Follow up           for future refills      Routing refill request to provider for review/approval because:  Labs not current:  It has been greater than 15 months since lab work was completed.  Due for provider visit: Last seen 7/2020; greater than 15 months ago.     Pt has pending appt 5/12/22.  She asks for small supply until appt?    Nydia Carrillo RN

## 2022-04-26 NOTE — TELEPHONE ENCOUNTER
Refilled, recommend patient to schedule fasting lab appt for lipid panel check    CAROLA Vargas CNP   LOW HEMOGLOBIN

## 2022-05-12 ENCOUNTER — OFFICE VISIT (OUTPATIENT)
Dept: FAMILY MEDICINE | Facility: CLINIC | Age: 62
End: 2022-05-12
Payer: COMMERCIAL

## 2022-05-12 VITALS
SYSTOLIC BLOOD PRESSURE: 118 MMHG | DIASTOLIC BLOOD PRESSURE: 66 MMHG | BODY MASS INDEX: 27.29 KG/M2 | TEMPERATURE: 96.1 F | WEIGHT: 148.3 LBS | HEART RATE: 63 BPM | OXYGEN SATURATION: 96 % | HEIGHT: 62 IN | RESPIRATION RATE: 16 BRPM

## 2022-05-12 DIAGNOSIS — Z11.4 SCREENING FOR HIV (HUMAN IMMUNODEFICIENCY VIRUS): ICD-10-CM

## 2022-05-12 DIAGNOSIS — E78.5 HYPERLIPIDEMIA LDL GOAL <100: ICD-10-CM

## 2022-05-12 DIAGNOSIS — Z12.11 SCREEN FOR COLON CANCER: ICD-10-CM

## 2022-05-12 DIAGNOSIS — I10 ESSENTIAL HYPERTENSION: Primary | ICD-10-CM

## 2022-05-12 LAB
ALBUMIN SERPL-MCNC: 3.7 G/DL (ref 3.4–5)
ALP SERPL-CCNC: 102 U/L (ref 40–150)
ALT SERPL W P-5'-P-CCNC: 33 U/L (ref 0–50)
ANION GAP SERPL CALCULATED.3IONS-SCNC: 7 MMOL/L (ref 3–14)
AST SERPL W P-5'-P-CCNC: 24 U/L (ref 0–45)
BILIRUB SERPL-MCNC: 0.6 MG/DL (ref 0.2–1.3)
BUN SERPL-MCNC: 17 MG/DL (ref 7–30)
CALCIUM SERPL-MCNC: 9.8 MG/DL (ref 8.5–10.1)
CHLORIDE BLD-SCNC: 105 MMOL/L (ref 94–109)
CHOLEST SERPL-MCNC: 167 MG/DL
CO2 SERPL-SCNC: 25 MMOL/L (ref 20–32)
CREAT SERPL-MCNC: 0.7 MG/DL (ref 0.52–1.04)
FASTING STATUS PATIENT QL REPORTED: YES
GFR SERPL CREATININE-BSD FRML MDRD: >90 ML/MIN/1.73M2
GLUCOSE BLD-MCNC: 125 MG/DL (ref 70–99)
HDLC SERPL-MCNC: 37 MG/DL
HIV 1+2 AB+HIV1 P24 AG SERPL QL IA: NONREACTIVE
LDLC SERPL CALC-MCNC: 87 MG/DL
NONHDLC SERPL-MCNC: 130 MG/DL
POTASSIUM BLD-SCNC: 3.9 MMOL/L (ref 3.4–5.3)
PROT SERPL-MCNC: 7.7 G/DL (ref 6.8–8.8)
SODIUM SERPL-SCNC: 137 MMOL/L (ref 133–144)
TRIGL SERPL-MCNC: 215 MG/DL

## 2022-05-12 PROCEDURE — 99214 OFFICE O/P EST MOD 30 MIN: CPT | Performed by: NURSE PRACTITIONER

## 2022-05-12 PROCEDURE — 87389 HIV-1 AG W/HIV-1&-2 AB AG IA: CPT | Performed by: NURSE PRACTITIONER

## 2022-05-12 PROCEDURE — 80061 LIPID PANEL: CPT | Performed by: NURSE PRACTITIONER

## 2022-05-12 PROCEDURE — 80053 COMPREHEN METABOLIC PANEL: CPT | Performed by: NURSE PRACTITIONER

## 2022-05-12 PROCEDURE — 36415 COLL VENOUS BLD VENIPUNCTURE: CPT | Performed by: NURSE PRACTITIONER

## 2022-05-12 RX ORDER — HYDROCHLOROTHIAZIDE 25 MG/1
25 TABLET ORAL DAILY
Qty: 90 TABLET | Refills: 3 | Status: SHIPPED | OUTPATIENT
Start: 2022-05-12 | End: 2023-05-25

## 2022-05-12 RX ORDER — AMLODIPINE BESYLATE 5 MG/1
TABLET ORAL
Qty: 90 TABLET | Refills: 3 | Status: SHIPPED | OUTPATIENT
Start: 2022-05-12 | End: 2022-11-09

## 2022-05-12 RX ORDER — ATENOLOL 100 MG/1
100 TABLET ORAL DAILY
Qty: 90 TABLET | Refills: 3 | Status: SHIPPED | OUTPATIENT
Start: 2022-05-12 | End: 2023-05-25

## 2022-05-12 RX ORDER — ATORVASTATIN CALCIUM 20 MG/1
20 TABLET, FILM COATED ORAL DAILY
Qty: 90 TABLET | Refills: 3 | Status: SHIPPED | OUTPATIENT
Start: 2022-05-12 | End: 2023-05-15

## 2022-05-12 RX ORDER — ATORVASTATIN CALCIUM 10 MG/1
10 TABLET, FILM COATED ORAL DAILY
Qty: 90 TABLET | Refills: 3 | Status: SHIPPED | OUTPATIENT
Start: 2022-05-12 | End: 2022-05-12

## 2022-05-12 ASSESSMENT — PAIN SCALES - GENERAL: PAINLEVEL: NO PAIN (0)

## 2022-05-12 NOTE — PROGRESS NOTES
Assessment & Plan     Essential hypertension  -well controlled   - REVIEW OF HEALTH MAINTENANCE PROTOCOL ORDERS  - amLODIPine (NORVASC) 5 MG tablet; Take 1 tablet (5 mg) by mouth once daily  - atenolol (TENORMIN) 100 MG tablet; Take 1 tablet (100 mg) by mouth daily  - hydrochlorothiazide (HYDRODIURIL) 25 MG tablet; Take 1 tablet (25 mg) by mouth daily  - Comprehensive metabolic panel (BMP + Alb, Alk Phos, ALT, AST, Total. Bili, TP); Future  - Lipid panel reflex to direct LDL Fasting; Future  - Comprehensive metabolic panel (BMP + Alb, Alk Phos, ALT, AST, Total. Bili, TP)  - Lipid panel reflex to direct LDL Fasting    Hyperlipidemia LDL goal <100    - REVIEW OF HEALTH MAINTENANCE PROTOCOL ORDERS  - atorvastatin (LIPITOR) 10 MG tablet; Take 1 tablet (10 mg) by mouth daily F  - Lipid panel reflex to direct LDL Fasting; Future  - Lipid panel reflex to direct LDL Fasting    Screening for HIV (human immunodeficiency virus)    - REVIEW OF HEALTH MAINTENANCE PROTOCOL ORDERS  - HIV Antigen Antibody Combo; Future  - HIV Antigen Antibody Combo    Screen for colon cancer    - Fecal colorectal cancer screen FIT - Future (S+30); Future        Return in about 1 year (around 5/12/2023) for Routine Visit.    CAROLA Vargas CNP  [facility]    Sonia Ramirez is a 61 year old who presents for the following health issues   History of Present Illness       Hyperlipidemia:  She presents for follow up of hyperlipidemia.  She is taking medication to lower cholesterol. She is not having myalgia or other side effects to statin medications.    Hypertension: She presents for follow up of hypertension.  She does not check blood pressure  regularly outside of the clinic. Outpatient blood pressures have not been over 140/90. She does not follow a low salt diet.     She eats 2-3 servings of fruits and vegetables daily.She consumes 2 sweetened beverage(s) daily.She exercises with enough effort to increase her  "heart rate 9 or less minutes per day.  She exercises with enough effort to increase her heart rate 3 or less days per week. She is missing 1 dose(s) of medications per week.  She is not taking prescribed medications regularly due to remembering to take.       Review of Systems   Constitutional, HEENT, cardiovascular, pulmonary, gi and gu systems are negative, except as otherwise noted.      Objective    /66 (BP Location: Right arm, Patient Position: Sitting, Cuff Size: Adult Large)   Pulse 63   Temp (!) 96.1  F (35.6  C) (Tympanic)   Resp 16   Ht 1.568 m (5' 1.75\")   Wt 67.3 kg (148 lb 4.8 oz)   LMP 06/27/2011   SpO2 96%   BMI 27.34 kg/m    Body mass index is 27.34 kg/m .  Physical Exam   GENERAL: healthy, alert and no distress  EYES: Eyes grossly normal to inspection, PERRL and conjunctivae and sclerae normal  HENT: ear canals and TM's normal, nose and mouth without ulcers or lesions  RESP: lungs clear to auscultation - no rales, rhonchi or wheezes  CV: regular rate and rhythm, normal S1 S2, no S3 or S4, no murmur, click or rub, no peripheral edema and peripheral pulses strong  NEURO: Normal strength and tone, mentation intact and speech normal  PSYCH: mentation appears normal, affect normal/bright    Results for orders placed or performed in visit on 05/12/22 (from the past 24 hour(s))   Comprehensive metabolic panel (BMP + Alb, Alk Phos, ALT, AST, Total. Bili, TP)   Result Value Ref Range    Sodium 137 133 - 144 mmol/L    Potassium 3.9 3.4 - 5.3 mmol/L    Chloride 105 94 - 109 mmol/L    Carbon Dioxide (CO2)      Anion Gap      Urea Nitrogen      Creatinine      Calcium      Glucose      Alkaline Phosphatase      AST      ALT      Protein Total      Albumin      Bilirubin Total      GFR Estimate                 "

## 2022-07-29 ENCOUNTER — TELEPHONE (OUTPATIENT)
Dept: FAMILY MEDICINE | Facility: CLINIC | Age: 62
End: 2022-07-29

## 2022-07-29 NOTE — TELEPHONE ENCOUNTER
Patient Quality Outreach    Patient is due for the following:   Colon Cancer Screening -  FIT and Cologuard  Cervical Cancer Screening - PAP Needed  Physical  - DUE NOW  Immunizations  -  Covid, Pneumococcal, and Zoster    NEXT STEPS:   Schedule a yearly physical    Type of outreach:    Sent [employer] message.      Questions for provider review:    None     HÉCTOR Lee LPN

## 2022-09-19 ENCOUNTER — HOSPITAL ENCOUNTER (OUTPATIENT)
Dept: MAMMOGRAPHY | Facility: CLINIC | Age: 62
Discharge: HOME | End: 2022-09-19
Attending: NURSE PRACTITIONER | Admitting: NURSE PRACTITIONER
Payer: COMMERCIAL

## 2022-09-19 DIAGNOSIS — Z12.31 VISIT FOR SCREENING MAMMOGRAM: ICD-10-CM

## 2022-09-19 PROCEDURE — 77067 SCR MAMMO BI INCL CAD: CPT

## 2022-11-09 ENCOUNTER — OFFICE VISIT (OUTPATIENT)
Dept: FAMILY MEDICINE | Facility: CLINIC | Age: 62
End: 2022-11-09
Payer: COMMERCIAL

## 2022-11-09 VITALS
RESPIRATION RATE: 16 BRPM | TEMPERATURE: 98 F | HEART RATE: 83 BPM | WEIGHT: 157.3 LBS | HEIGHT: 62 IN | OXYGEN SATURATION: 98 % | DIASTOLIC BLOOD PRESSURE: 82 MMHG | SYSTOLIC BLOOD PRESSURE: 139 MMHG | BODY MASS INDEX: 28.95 KG/M2

## 2022-11-09 DIAGNOSIS — Z23 HIGH PRIORITY FOR 2019-NCOV VACCINE: Primary | ICD-10-CM

## 2022-11-09 DIAGNOSIS — I10 ESSENTIAL HYPERTENSION WITH GOAL BLOOD PRESSURE LESS THAN 140/90: ICD-10-CM

## 2022-11-09 DIAGNOSIS — H69.93 DYSFUNCTION OF BOTH EUSTACHIAN TUBES: ICD-10-CM

## 2022-11-09 DIAGNOSIS — Z12.4 CERVICAL CANCER SCREENING: ICD-10-CM

## 2022-11-09 DIAGNOSIS — Z12.11 SCREEN FOR COLON CANCER: ICD-10-CM

## 2022-11-09 PROCEDURE — 91312 COVID-19,PF,PFIZER BOOSTER BIVALENT: CPT | Performed by: NURSE PRACTITIONER

## 2022-11-09 PROCEDURE — 99214 OFFICE O/P EST MOD 30 MIN: CPT | Mod: 25 | Performed by: NURSE PRACTITIONER

## 2022-11-09 PROCEDURE — 90682 RIV4 VACC RECOMBINANT DNA IM: CPT | Performed by: NURSE PRACTITIONER

## 2022-11-09 PROCEDURE — 0124A COVID-19,PF,PFIZER BOOSTER BIVALENT: CPT | Performed by: NURSE PRACTITIONER

## 2022-11-09 PROCEDURE — 90471 IMMUNIZATION ADMIN: CPT | Performed by: NURSE PRACTITIONER

## 2022-11-09 PROCEDURE — 87624 HPV HI-RISK TYP POOLED RSLT: CPT | Performed by: NURSE PRACTITIONER

## 2022-11-09 PROCEDURE — G0145 SCR C/V CYTO,THINLAYER,RESCR: HCPCS | Performed by: NURSE PRACTITIONER

## 2022-11-09 RX ORDER — AMLODIPINE BESYLATE 5 MG/1
7.5 TABLET ORAL DAILY
Qty: 135 TABLET | Refills: 3 | Status: SHIPPED | OUTPATIENT
Start: 2022-11-09 | End: 2022-11-10

## 2022-11-09 RX ORDER — PREDNISONE 20 MG/1
20 TABLET ORAL 2 TIMES DAILY
Qty: 10 TABLET | Refills: 0 | Status: SHIPPED | OUTPATIENT
Start: 2022-11-09 | End: 2022-11-14

## 2022-11-09 RX ORDER — LORATADINE 10 MG/1
10 TABLET ORAL DAILY
Qty: 30 TABLET | Refills: 1 | Status: SHIPPED | OUTPATIENT
Start: 2022-11-09 | End: 2024-04-24

## 2022-11-09 ASSESSMENT — PAIN SCALES - GENERAL: PAINLEVEL: NO PAIN (0)

## 2022-11-09 NOTE — PROGRESS NOTES
"  Assessment & Plan     Cervical cancer screening    - Pap imaged thin layer screen with HPV - recommended age 30 - 65 years (select HPV order below)    Screen for colon cancer    - Fecal colorectal cancer screen FIT - Future (S+30); Future    High priority for 2019-nCoV vaccine    - COVID-19,PF,PFIZER BOOSTER BIVALENT 12+Yrs    Dysfunction of both eustachian tubes    - Adult ENT  Referral; Future  - start predniSONE (DELTASONE) 20 MG tablet; Take 1 tablet (20 mg) by mouth 2 times daily for 5 days  - loratadine (CLARITIN) 10 MG tablet; Take 1 tablet (10 mg) by mouth daily  -follow up with ENT if no improvement     Essential hypertension with goal blood pressure less than 140/90  -uncontrolled, recommended to increase Amlodipine   - amLODIPine (NORVASC) 5 MG tablet; Take 1.5 tablets (7.5 mg) by mouth daily Take 1 tablet (5 mg) by mouth once daily  -monitor BP at home   -continue Atenolol 100 mg daily AM  -continue hydrochlorothiazide 25 mg daily AM       Nicotine/Tobacco Cessation:  She reports that she has been smoking cigarettes. She has a 15.00 pack-year smoking history. She has never used smokeless tobacco.  Nicotine/Tobacco Cessation Plan:   Self help information given to patient      BMI:   Estimated body mass index is 29 kg/m  as calculated from the following:    Height as of this encounter: 1.568 m (5' 1.75\").    Weight as of this encounter: 71.4 kg (157 lb 4.8 oz).       CAROLA Vargas CNP  [facility]    Sonia Ramirez is a 62 year old female patient presenting for the following health issues: complains of bilateral ear fullness   Ear Problem and Imm/Inj (COVID-19 VACCINE)      HPI     Concern - Ear concerns   Onset: 2 months   Description: Both of her ears feel like they are plugged.   Intensity: mild  Progression of Symptoms:  same  Accompanying Signs & Symptoms: none   Previous history of similar problem: none   Precipitating factors:        Worsened by: none " "  Alleviating factors:        Improved by: none   Therapies tried and outcome:  none     Hypertension Follow-up      Do you check your blood pressure regularly outside of the clinic? Yes at home     Are you following a low salt diet? No-    Are your blood pressures ever more than 140 on the top number (systolic) OR more   than 90 on the bottom number (diastolic), for example 140/90? Yes      Review of Systems   Constitutional, HEENT, cardiovascular, pulmonary, gi and gu systems are negative, except as otherwise noted.      Objective    /82 (BP Location: Right arm, Patient Position: Sitting, Cuff Size: Adult Regular)   Pulse 83   Temp 98  F (36.7  C) (Tympanic)   Resp 16   Ht 1.568 m (5' 1.75\")   Wt 71.4 kg (157 lb 4.8 oz)   LMP 06/27/2011   SpO2 98%   BMI 29.00 kg/m    Body mass index is 29 kg/m .  Physical Exam   GENERAL: healthy, alert and no distress  EYES: Eyes grossly normal to inspection, PERRL and conjunctivae and sclerae normal  HENT: normal cephalic/atraumatic, right ear: clear effusion, left ear: normal: no effusions, no erythema, normal landmarks, nasal mucosa edematous , oropharynx clear and oral mucous membranes moist  NECK: no adenopathy, no asymmetry, masses, or scars and thyroid normal to palpation  RESP: lungs clear to auscultation - no rales, rhonchi or wheezes  CV: regular rate and rhythm, normal S1 S2, no S3 or S4, no murmur, click or rub, no peripheral edema and peripheral pulses strong  : normal vulva, vaginal canal and cervix, pap test was done.   NEURO: Normal strength and tone, mentation intact and speech normal  PSYCH: mentation appears normal, affect normal/bright                    "

## 2022-11-09 NOTE — PATIENT INSTRUCTIONS
Atenolol 100 mg AM  Hydrochlorothiazide  25 mg AM  Amlodipine 7.5 mg in the evening  Continue Lipitor at bedtime     Start Prednisone 20 mg twice daily for 5 days   Then start Claritin 10 mg daily    Follow up with ENT

## 2022-11-10 ENCOUNTER — TELEPHONE (OUTPATIENT)
Dept: FAMILY MEDICINE | Facility: CLINIC | Age: 62
End: 2022-11-10

## 2022-11-10 DIAGNOSIS — I10 ESSENTIAL HYPERTENSION WITH GOAL BLOOD PRESSURE LESS THAN 140/90: ICD-10-CM

## 2022-11-10 RX ORDER — AMLODIPINE BESYLATE 5 MG/1
7.5 TABLET ORAL DAILY
Qty: 135 TABLET | Refills: 3 | Status: SHIPPED | OUTPATIENT
Start: 2022-11-10 | End: 2023-09-21

## 2022-11-10 NOTE — TELEPHONE ENCOUNTER
amLODIPine (NORVASC) 5 MG tablet 135 tablet 3 11/9/2022  No   Sig - Route: Take 1.5 tablets (7.5 mg) by mouth daily Take 1 tablet (5 mg) by mouth once daily - Oral   Sent to pharmacy as: amLODIPine Besylate 5 MG Oral Tablet (NORVASC)   Class: E-Prescribe   Order: [id number]   E-Prescribing Status: Receipt confirmed by pharmacy (11/9/2022  1:39 PM CST)       2 sets of directions. Please clarify and send new RX to pharmacy.

## 2022-11-11 LAB
BKR LAB AP GYN ADEQUACY: NORMAL
BKR LAB AP GYN INTERPRETATION: NORMAL
BKR LAB AP HPV REFLEX: NORMAL
BKR LAB AP PREVIOUS ABNORMAL: NORMAL
PATH REPORT.COMMENTS IMP SPEC: NORMAL
PATH REPORT.COMMENTS IMP SPEC: NORMAL
PATH REPORT.RELEVANT HX SPEC: NORMAL

## 2022-11-14 LAB
HUMAN PAPILLOMA VIRUS 16 DNA: NEGATIVE
HUMAN PAPILLOMA VIRUS 18 DNA: NEGATIVE
HUMAN PAPILLOMA VIRUS FINAL DIAGNOSIS: NORMAL
HUMAN PAPILLOMA VIRUS OTHER HR: NEGATIVE

## 2022-11-23 ENCOUNTER — TELEPHONE (OUTPATIENT)
Dept: FAMILY MEDICINE | Facility: CLINIC | Age: 62
End: 2022-11-23

## 2022-11-23 NOTE — TELEPHONE ENCOUNTER
Reason for call:    Symptom or request:     Patient called stating that she has an appt on 11/30- however since last visit with you on 11/10 patient saw a dentist and it turns out she has 2 teeth that need to be removed. Dentist place her on antibiotic, and she has an appt to remove teeth on 12/9; she is asking if she should keep appt with you for 11/30?  She is being referred to periodontist due bone lost.  Also has history of cancer.     Patient feel better since completing doses of prednisone and starting abx.     Should she keep appt with Cheryle of 11/30? Told patient it might be a good idea to as there's concern for bone lost?    Best Time:  any    Can we leave a detailed message on this number?  YES     Marielle LI  Station

## 2022-11-30 ENCOUNTER — OFFICE VISIT (OUTPATIENT)
Dept: FAMILY MEDICINE | Facility: CLINIC | Age: 62
End: 2022-11-30
Payer: COMMERCIAL

## 2022-11-30 VITALS
SYSTOLIC BLOOD PRESSURE: 120 MMHG | WEIGHT: 155.3 LBS | RESPIRATION RATE: 16 BRPM | TEMPERATURE: 96.2 F | HEIGHT: 62 IN | OXYGEN SATURATION: 98 % | HEART RATE: 64 BPM | BODY MASS INDEX: 28.58 KG/M2 | DIASTOLIC BLOOD PRESSURE: 70 MMHG

## 2022-11-30 DIAGNOSIS — H69.91 DYSFUNCTION OF RIGHT EUSTACHIAN TUBE: Primary | ICD-10-CM

## 2022-11-30 DIAGNOSIS — C50.919 MALIGNANT NEOPLASM OF FEMALE BREAST, UNSPECIFIED ESTROGEN RECEPTOR STATUS, UNSPECIFIED LATERALITY, UNSPECIFIED SITE OF BREAST (H): ICD-10-CM

## 2022-11-30 DIAGNOSIS — Z23 NEED FOR VACCINATION: ICD-10-CM

## 2022-11-30 DIAGNOSIS — M81.0 AGE-RELATED OSTEOPOROSIS WITHOUT CURRENT PATHOLOGICAL FRACTURE: ICD-10-CM

## 2022-11-30 DIAGNOSIS — Z12.11 SCREEN FOR COLON CANCER: ICD-10-CM

## 2022-11-30 LAB — HEMOCCULT STL QL IA: NEGATIVE

## 2022-11-30 PROCEDURE — 82274 ASSAY TEST FOR BLOOD FECAL: CPT | Performed by: NURSE PRACTITIONER

## 2022-11-30 PROCEDURE — 90714 TD VACC NO PRESV 7 YRS+ IM: CPT | Performed by: NURSE PRACTITIONER

## 2022-11-30 PROCEDURE — 99214 OFFICE O/P EST MOD 30 MIN: CPT | Mod: 25 | Performed by: NURSE PRACTITIONER

## 2022-11-30 PROCEDURE — 90471 IMMUNIZATION ADMIN: CPT | Performed by: NURSE PRACTITIONER

## 2022-11-30 RX ORDER — FLUTICASONE PROPIONATE 50 MCG
1 SPRAY, SUSPENSION (ML) NASAL DAILY
Qty: 16 G | Refills: 0 | Status: SHIPPED | OUTPATIENT
Start: 2022-11-30 | End: 2024-04-24

## 2022-11-30 ASSESSMENT — PAIN SCALES - GENERAL: PAINLEVEL: NO PAIN (0)

## 2022-11-30 NOTE — PROGRESS NOTES
Assessment & Plan     Dysfunction of right eustachian tube  -currently on Amoxicillin was dental infection, patient is scheduled for follow up with dentist next week   - fluticasone (FLONASE) 50 MCG/ACT nasal spray; Spray 1 spray into both nostrils daily    Malignant neoplasm of female breast, unspecified estrogen receptor status, unspecified laterality, unspecified site of breast (H)  -in remission, recent mammogram was normal  -recommended to continue annual screening     Need for vaccination  - TD, PF, 7+YRS (TENIVAC/DECAVAC)    Age-related osteoporosis without current pathological fracture    - DX Hip/Pelvis/Spine; Future    Screen for colon cancer    - Fecal colorectal cancer screen FIT - Future (S+30)      CAROLA Vargas CNP  [facility] is [facility] 62 year old pleasant female patient presenting for the following health issues:  Ear Problem      History of Present Illness       Reason for visit:  Concern about ongoing sensations of ears feeling 'plugged'  Symptom onset:  More than a month  Symptoms include:  Feeling of ears being plugged, try to swallow to clear but does not work, odd sensations like I was going to pass out although I never didstatoin  Symptom intensity:  Moderate  Symptom progression:  Worsening  Had these symptoms before:  No  What makes it worse:  Increased activity  What makes it better:  Not really, maybe just rest, relax and time    She eats 2-3 servings of fruits and vegetables daily.She consumes 1 sweetened beverage(s) daily.She exercises with enough effort to increase her heart rate 9 or less minutes per day.  She exercises with enough effort to increase her heart rate 3 or less days per week.   She is taking medications regularly.     Review of Systems   Constitutional, HEENT, cardiovascular, pulmonary, gi and gu systems are negative, except as otherwise noted.      Objective    /70 (BP Location: Right arm, Patient Position:  "Sitting, Cuff Size: Adult Large)   Pulse 64   Temp (!) 96.2  F (35.7  C) (Tympanic)   Resp 16   Ht 1.568 m (5' 1.75\")   Wt 70.4 kg (155 lb 4.8 oz)   LMP 06/27/2011   SpO2 98%   BMI 28.64 kg/m    Body mass index is 28.64 kg/m .  Physical Exam   GENERAL: healthy, alert and no distress  EYES: Eyes grossly normal to inspection, PERRL and conjunctivae and sclerae normal  HENT: normal cephalic/atraumatic, right ear: clear effusion and left ear: normal: no effusions, no erythema, normal landmarks  NECK: no adenopathy, no asymmetry, masses, or scars and thyroid normal to palpation  RESP: lungs clear to auscultation - no rales, rhonchi or wheezes  CV: regular rate and rhythm, normal S1 S2, no S3 or S4, no murmur, click or rub, no peripheral edema and peripheral pulses strong  NEURO: Normal strength and tone, mentation intact and speech normal  PSYCH: mentation appears normal, affect normal/bright                    "

## 2022-12-13 ENCOUNTER — HOSPITAL ENCOUNTER (OUTPATIENT)
Dept: BONE DENSITY | Facility: CLINIC | Age: 62
Discharge: HOME | End: 2022-12-13
Attending: NURSE PRACTITIONER | Admitting: NURSE PRACTITIONER
Payer: COMMERCIAL

## 2022-12-13 DIAGNOSIS — M81.0 AGE-RELATED OSTEOPOROSIS WITHOUT CURRENT PATHOLOGICAL FRACTURE: ICD-10-CM

## 2022-12-13 PROCEDURE — 77080 DXA BONE DENSITY AXIAL: CPT

## 2022-12-15 DIAGNOSIS — M81.0 AGE-RELATED OSTEOPOROSIS WITHOUT CURRENT PATHOLOGICAL FRACTURE: Primary | ICD-10-CM

## 2022-12-15 RX ORDER — ALENDRONATE SODIUM 70 MG/1
70 TABLET ORAL
Qty: 12 TABLET | Refills: 3 | Status: SHIPPED | OUTPATIENT
Start: 2022-12-15 | End: 2024-04-24

## 2023-01-24 NOTE — PROGRESS NOTES
Chief Complaint   Patient presents with     Ear Problem     Possible fluid in ears- ears feel plugged- had tooth infection and put on oral antibiotics and tooth pulled so symptoms better but not gone - was given flonase but hasn't started it yet     History of Present Illness   Ashley Coombs is a 62 year old female who presents to me today for ear evaluation.  I am seeing this patient in consultation for dysfunction of both eustachian tubes at the request of the provider Cheryle Castellon CNP. The patient reports sensation of ear plugging and fullness for the past several months.  It was gradual in onset.  The patient has not really noticed increased difficulty hearing.  She denies any otalgia, otorrhea, bloody otorrhea.  No dizziness or vertigo.  No prior history of ear surgery.   The patient does have some mild intermittent tinnitus.  She denies any significant noise exposure history.  Of note, she had some recent dental issues.  She had a tooth pulled and was placed on antibiotics and felt like the ear discomfort did improve.  They do still feel plugged on a pretty continuous basis.  No significant allergy history or previous allergy testing.  She was prescribed Flonase but has yet to try Flonase.  She is a tobacco user, roughly 1/2 pack/day.  She has at least a 15-20-pack-year smoking history.     Past Medical History  Patient Active Problem List   Diagnosis     Essential hypertension     Generalized anxiety disorder     Other specified periodontal diseases     Tobacco use disorder     Condyloma acuminatum     Hyperlipidemia LDL goal <130     Breast cancer (H)     Post-menopausal bleeding     GERD (gastroesophageal reflux disease)     Essential hypertension with goal blood pressure less than 140/90     Current Medications     Current Outpatient Medications:      amLODIPine (NORVASC) 5 MG tablet, Take 1.5 tablets (7.5 mg) by mouth daily, Disp: 135 tablet, Rfl: 3     ascorbic acid (VITAMIN C) 1000 MG TABS, Take  1,000 mg by mouth daily, Disp: , Rfl:      atenolol (TENORMIN) 100 MG tablet, Take 1 tablet (100 mg) by mouth daily, Disp: 90 tablet, Rfl: 3     atorvastatin (LIPITOR) 20 MG tablet, Take 1 tablet (20 mg) by mouth daily, Disp: 90 tablet, Rfl: 3     Calcium Citrate (CITRACAL OR), Take 2 tablets by mouth daily Patient states taking 1200mg, Disp: , Rfl:      hydrochlorothiazide (HYDRODIURIL) 25 MG tablet, Take 1 tablet (25 mg) by mouth daily, Disp: 90 tablet, Rfl: 3     vitamin D3 (CHOLECALCIFEROL) 50 mcg (2000 units) tablet, Take 1 tablet by mouth daily, Disp: , Rfl:      alendronate (FOSAMAX) 70 MG tablet, Take 1 tablet (70 mg) by mouth every 7 days (Patient not taking: Reported on 1/30/2023), Disp: 12 tablet, Rfl: 3     fluticasone (FLONASE) 50 MCG/ACT nasal spray, Spray 1 spray into both nostrils daily (Patient not taking: Reported on 1/30/2023), Disp: 16 g, Rfl: 0     loratadine (CLARITIN) 10 MG tablet, Take 1 tablet (10 mg) by mouth daily (Patient not taking: Reported on 1/30/2023), Disp: 30 tablet, Rfl: 1    Allergies  Allergies   Allergen Reactions     Zyban [Bupropion Hcl] Other (See Comments) and Swelling     Higher blood pressure       Social History   Social History     Socioeconomic History     Marital status:    Tobacco Use     Smoking status: Every Day     Packs/day: 0.50     Years: 30.00     Pack years: 15.00     Types: Cigarettes     Smokeless tobacco: Never     Tobacco comments:     cutting back and trying to quit   Substance and Sexual Activity     Alcohol use: Yes     Comment: 5 beers per month     Drug use: No     Sexual activity: Yes     Partners: Male     Birth control/protection: Condom   Other Topics Concern     Parent/sibling w/ CABG, MI or angioplasty before 65F 55M? No       Family History  Family History   Problem Relation Age of Onset     Arthritis Mother      C.A.D. Mother      Cancer Mother         brain tumor     Hypertension Mother      Thyroid Disease Mother      Hypertension  Father      Alcohol/Drug Father      Cardiovascular Father 83        AAA     Osteoporosis Sister 55     Psychotic Disorder Sister         anxiety     Cancer Maternal Grandmother         uterine cancer     Thyroid Disease Maternal Grandmother      SAM. Maternal Aunt      JONATHAN.A.PRASANNA. Maternal Uncle      Diabetes No family hx of      Cerebrovascular Disease No family hx of      Breast Cancer No family hx of      Cancer - colorectal No family hx of        Review of Systems  As per HPI and PMHx, otherwise 10+ comprehensive system review is negative.    Physical Exam  /77 (BP Location: Left arm, Patient Position: Chair, Cuff Size: Adult Regular)   Pulse 60   Temp 98.4  F (36.9  C) (Tympanic)   Wt 70.3 kg (155 lb)   LMP 06/27/2011   SpO2 98%   BMI 28.58 kg/m    GENERAL: Patient is a pleasant, cooperative 62 year old female in no acute distress.  HEAD: Normocephalic, atraumatic.  Hair and scalp are normal.  EYES: Pupils are equal, round, reactive to light and accommodation.  Extraocular movements are intact.  The sclera nonicteric without injection.  The extraocular structures are normal.  EARS: Normal shape and symmetry.  No tenderness when palpating the mastoid or tragal areas bilaterally.  Otoscopic exam reveals a moderate amount of cerumen that I cleaned with a curette bilaterally.  The bilateral tympanic membranes are round, intact without evidence of effusion, good landmarks.  No retraction, granulation, or drainage.  NOSE: Nares are patent.  Nasal mucosa is slightly dry, boggy, inflamed with sticky, inflammatory mucus.  No nasal cavity masses, polyps, or mucopurulence on anterior rhinoscopy  NEUROLOGIC: Cranial nerves II through XII are grossly intact.  Voice is strong.  Patient is House-Brackmann I/VI bilaterally.  CARDIOVASCULAR: Extremities are warm and well-perfused.  No significant peripheral edema.  RESPIRATORY: Patient has nonlabored breathing without cough, wheeze, stridor.  PSYCHIATRIC: Patient is  alert and oriented.  Mood and affect appear normal.  SKIN: Warm and dry.  No scalp, face, or neck lesions noted.    Audiogram  The patient underwent an audiogram performed today.  My review of the audiogram shows mild sloping to moderate high-frequency sensorineural hearing loss in both ears.  Pure-tone average is 12 dB on the right and 13 dB on the left.  Speech reception threshold is 15 dB on the right and 10 dB on the left.  The patient had 100% word recognition on the right and 96% word recognition on the left.  The patient had a type A tympanogram on the right and a type A tympanogram on the left.  There is degree flexible present ipsilaterally and contralaterally in both ears    Assessment and Plan     ICD-10-CM    1. Bilateral high frequency sensorineural hearing loss  H90.3 Adult Audiology  Referral      2. Ear fullness, bilateral  H93.8X3 Adult Audiology  Referral      3. Tobacco dependence  F17.200 Adult Audiology  Referral      4. Encounter for tobacco use cessation counseling  Z71.6 Adult Audiology  Referral        It was my pleasure seeing Ashley Coombs today in clinic.  The patient presents today with some mild to moderate high-frequency sensorineural hearing loss.  This is most consistent with presbycusis. I can find no evidence of serious CNS disorders or other complicating factors that could be causing this.  We spent the remainder of today's visit on education. We discussed hearing protection in noisy environments.    The patient still has sensation of ear plugging and fullness.  It is possible this is related to some of her dental issues, TMJ, or nasal inflammation from either allergy or tobacco irritation.  Based on her tympanometry, she does not have any significant negative pressure or obvious air-bone gap consistent with conductive hearing loss.  She can try the Flonase if she would like.  I encouraged tobacco cessation.  We can see the patient back in 3  to 5 years for repeat ear exam and audiometric assessment.    Gilson Vidal MD  Department of Otolaryngology-Head and Neck Surgery  [facility]

## 2023-01-30 ENCOUNTER — OFFICE VISIT (OUTPATIENT)
Dept: AUDIOLOGY | Facility: CLINIC | Age: 63
End: 2023-01-30
Attending: NURSE PRACTITIONER
Payer: COMMERCIAL

## 2023-01-30 ENCOUNTER — OFFICE VISIT (OUTPATIENT)
Dept: OTOLARYNGOLOGY | Facility: CLINIC | Age: 63
End: 2023-01-30
Attending: NURSE PRACTITIONER
Payer: COMMERCIAL

## 2023-01-30 VITALS
DIASTOLIC BLOOD PRESSURE: 77 MMHG | WEIGHT: 155 LBS | BODY MASS INDEX: 28.58 KG/M2 | SYSTOLIC BLOOD PRESSURE: 128 MMHG | HEART RATE: 60 BPM | OXYGEN SATURATION: 98 % | TEMPERATURE: 98.4 F

## 2023-01-30 DIAGNOSIS — H93.8X3 EAR FULLNESS, BILATERAL: ICD-10-CM

## 2023-01-30 DIAGNOSIS — F17.200 TOBACCO DEPENDENCE: ICD-10-CM

## 2023-01-30 DIAGNOSIS — Z71.6 ENCOUNTER FOR TOBACCO USE CESSATION COUNSELING: ICD-10-CM

## 2023-01-30 DIAGNOSIS — H90.3 BILATERAL HIGH FREQUENCY SENSORINEURAL HEARING LOSS: ICD-10-CM

## 2023-01-30 DIAGNOSIS — H69.93 DYSFUNCTION OF BOTH EUSTACHIAN TUBES: ICD-10-CM

## 2023-01-30 DIAGNOSIS — H90.3 BILATERAL HIGH FREQUENCY SENSORINEURAL HEARING LOSS: Primary | ICD-10-CM

## 2023-01-30 PROCEDURE — 92557 COMPREHENSIVE HEARING TEST: CPT | Performed by: AUDIOLOGIST

## 2023-01-30 PROCEDURE — 92550 TYMPANOMETRY & REFLEX THRESH: CPT | Performed by: AUDIOLOGIST

## 2023-01-30 PROCEDURE — 99207 PR NO CHARGE LOS: CPT | Performed by: AUDIOLOGIST

## 2023-01-30 PROCEDURE — 99243 OFF/OP CNSLTJ NEW/EST LOW 30: CPT | Performed by: OTOLARYNGOLOGY

## 2023-01-30 RX ORDER — CHOLECALCIFEROL (VITAMIN D3) 50 MCG
1 TABLET ORAL DAILY
COMMUNITY

## 2023-01-30 NOTE — LETTER
1/30/2023         RE: Ashley Coombs  [address] [facility] [id number]        Dear Colleague,    Thank you for referring your patient, Ashley Coombs, to the [facility]. Please see a copy of my visit note below.    Chief Complaint   Patient presents with     Ear Problem     Possible fluid in ears- ears feel plugged- had tooth infection and put on oral antibiotics and tooth pulled so symptoms better but not gone - was given flonase but hasn't started it yet     History of Present Illness   Ashley Coombs is a 62 year old female who presents to me today for ear evaluation.  I am seeing this patient in consultation for dysfunction of both eustachian tubes at the request of the provider Cheryle Castellon CNP. The patient reports sensation of ear plugging and fullness for the past several months.  It was gradual in onset.  The patient has not really noticed increased difficulty hearing.  She denies any otalgia, otorrhea, bloody otorrhea.  No dizziness or vertigo.  No prior history of ear surgery.   The patient does have some mild intermittent tinnitus.  She denies any significant noise exposure history.  Of note, she had some recent dental issues.  She had a tooth pulled and was placed on antibiotics and felt like the ear discomfort did improve.  They do still feel plugged on a pretty continuous basis.  No significant allergy history or previous allergy testing.  She was prescribed Flonase but has yet to try Flonase.  She is a tobacco user, roughly 1/2 pack/day.  She has at least a 15-20-pack-year smoking history.     Past Medical History  Patient Active Problem List   Diagnosis     Essential hypertension     Generalized anxiety disorder     Other specified periodontal diseases     Tobacco use disorder     Condyloma acuminatum     Hyperlipidemia LDL goal <130     Breast cancer (H)     Post-menopausal bleeding     GERD (gastroesophageal reflux disease)     Essential hypertension  with goal blood pressure less than 140/90     Current Medications     Current Outpatient Medications:      amLODIPine (NORVASC) 5 MG tablet, Take 1.5 tablets (7.5 mg) by mouth daily, Disp: 135 tablet, Rfl: 3     ascorbic acid (VITAMIN C) 1000 MG TABS, Take 1,000 mg by mouth daily, Disp: , Rfl:      atenolol (TENORMIN) 100 MG tablet, Take 1 tablet (100 mg) by mouth daily, Disp: 90 tablet, Rfl: 3     atorvastatin (LIPITOR) 20 MG tablet, Take 1 tablet (20 mg) by mouth daily, Disp: 90 tablet, Rfl: 3     Calcium Citrate (CITRACAL OR), Take 2 tablets by mouth daily Patient states taking 1200mg, Disp: , Rfl:      hydrochlorothiazide (HYDRODIURIL) 25 MG tablet, Take 1 tablet (25 mg) by mouth daily, Disp: 90 tablet, Rfl: 3     vitamin D3 (CHOLECALCIFEROL) 50 mcg (2000 units) tablet, Take 1 tablet by mouth daily, Disp: , Rfl:      alendronate (FOSAMAX) 70 MG tablet, Take 1 tablet (70 mg) by mouth every 7 days (Patient not taking: Reported on 1/30/2023), Disp: 12 tablet, Rfl: 3     fluticasone (FLONASE) 50 MCG/ACT nasal spray, Spray 1 spray into both nostrils daily (Patient not taking: Reported on 1/30/2023), Disp: 16 g, Rfl: 0     loratadine (CLARITIN) 10 MG tablet, Take 1 tablet (10 mg) by mouth daily (Patient not taking: Reported on 1/30/2023), Disp: 30 tablet, Rfl: 1    Allergies  Allergies   Allergen Reactions     Zyban [Bupropion Hcl] Other (See Comments) and Swelling     Higher blood pressure       Social History   Social History     Socioeconomic History     Marital status:    Tobacco Use     Smoking status: Every Day     Packs/day: 0.50     Years: 30.00     Pack years: 15.00     Types: Cigarettes     Smokeless tobacco: Never     Tobacco comments:     cutting back and trying to quit   Substance and Sexual Activity     Alcohol use: Yes     Comment: 5 beers per month     Drug use: No     Sexual activity: Yes     Partners: Male     Birth control/protection: Condom   Other Topics Concern     Parent/sibling w/ CABG,  MI or angioplasty before 65F 55M? No       Family History  Family History   Problem Relation Age of Onset     Arthritis Mother      C.A.D. Mother      Cancer Mother         brain tumor     Hypertension Mother      Thyroid Disease Mother      Hypertension Father      Alcohol/Drug Father      Cardiovascular Father 83        AAA     Osteoporosis Sister 55     Psychotic Disorder Sister         anxiety     Cancer Maternal Grandmother         uterine cancer     Thyroid Disease Maternal Grandmother      C.A.D. Maternal Aunt      C.A.D. Maternal Uncle      Diabetes No family hx of      Cerebrovascular Disease No family hx of      Breast Cancer No family hx of      Cancer - colorectal No family hx of        Review of Systems  As per HPI and PMHx, otherwise 10+ comprehensive system review is negative.    Physical Exam  /77 (BP Location: Left arm, Patient Position: Chair, Cuff Size: Adult Regular)   Pulse 60   Temp 98.4  F (36.9  C) (Tympanic)   Wt 70.3 kg (155 lb)   LMP 06/27/2011   SpO2 98%   BMI 28.58 kg/m    GENERAL: Patient is a pleasant, cooperative 62 year old female in no acute distress.  HEAD: Normocephalic, atraumatic.  Hair and scalp are normal.  EYES: Pupils are equal, round, reactive to light and accommodation.  Extraocular movements are intact.  The sclera nonicteric without injection.  The extraocular structures are normal.  EARS: Normal shape and symmetry.  No tenderness when palpating the mastoid or tragal areas bilaterally.  Otoscopic exam reveals a moderate amount of cerumen that I cleaned with a curette bilaterally.  The bilateral tympanic membranes are round, intact without evidence of effusion, good landmarks.  No retraction, granulation, or drainage.  NOSE: Nares are patent.  Nasal mucosa is slightly dry, boggy, inflamed with sticky, inflammatory mucus.  No nasal cavity masses, polyps, or mucopurulence on anterior rhinoscopy  NEUROLOGIC: Cranial nerves II through XII are grossly intact.   Voice is strong.  Patient is House-Brackmann I/VI bilaterally.  CARDIOVASCULAR: Extremities are warm and well-perfused.  No significant peripheral edema.  RESPIRATORY: Patient has nonlabored breathing without cough, wheeze, stridor.  PSYCHIATRIC: Patient is alert and oriented.  Mood and affect appear normal.  SKIN: Warm and dry.  No scalp, face, or neck lesions noted.    Audiogram  The patient underwent an audiogram performed today.  My review of the audiogram shows mild sloping to moderate high-frequency sensorineural hearing loss in both ears.  Pure-tone average is 12 dB on the right and 13 dB on the left.  Speech reception threshold is 15 dB on the right and 10 dB on the left.  The patient had 100% word recognition on the right and 96% word recognition on the left.  The patient had a type A tympanogram on the right and a type A tympanogram on the left.  There is degree flexible present ipsilaterally and contralaterally in both ears    Assessment and Plan     ICD-10-CM    1. Bilateral high frequency sensorineural hearing loss  H90.3 Adult Audiology  Referral      2. Ear fullness, bilateral  H93.8X3 Adult Audiology  Referral      3. Tobacco dependence  F17.200 Adult Audiology  Referral      4. Encounter for tobacco use cessation counseling  Z71.6 Adult Audiology  Referral        It was my pleasure seeing Ashley Coombs today in clinic.  The patient presents today with some mild to moderate high-frequency sensorineural hearing loss.  This is most consistent with presbycusis. I can find no evidence of serious CNS disorders or other complicating factors that could be causing this.  We spent the remainder of today's visit on education. We discussed hearing protection in noisy environments.    The patient still has sensation of ear plugging and fullness.  It is possible this is related to some of her dental issues, TMJ, or nasal inflammation from either allergy or tobacco irritation.   Based on her tympanometry, she does not have any significant negative pressure or obvious air-bone gap consistent with conductive hearing loss.  She can try the Flonase if she would like.  I encouraged tobacco cessation.  We can see the patient back in 3 to 5 years for repeat ear exam and audiometric assessment.    Gilson Vidal MD  Department of Otolaryngology-Head and Neck Surgery  [facility]         Again, thank you for allowing me to participate in the care of your patient.        Sincerely,        Gilson Vidal MD

## 2023-01-30 NOTE — PROGRESS NOTES
AUDIOLOGY REPORT:    Patient was referred from ENT by Dr. Vidal for audiology evaluation. The patient reports that she has been having a plugged feeling in both ears, like she needs to clear them. This has started in September of last year. The patient reports that she was noted to have ear fluid and it was recommended that she use a nasal spray, which she has not tried yet. The patient reports that she has been having dental problems and she noted some improvement after having a tooth pulled. She notes that she still needs other dental work done. The patient reports that she does not have ear pain and has not noted any changes in hearing. She notes a family history of hearing loss with age. The patient reports that she does not have a history of ear problems or ear surgery or a history of loud noise exposure. She reports that she does not have allergy concerns.    Testing:    Otoscopy:   Otoscopic exam indicates ears are clear of cerumen bilaterally     Tympanograms:    RIGHT: normal eardrum mobility     LEFT:   normal eardrum mobility    Reflexes (reported by stimulus ear):  RIGHT: Ipsilateral is present at normal levels  RIGHT: Contralateral is present at normal levels  LEFT:   Ipsilateral is present at normal levels  LEFT:   Contralateral is present at normal levels    Thresholds:   Pure Tone Thresholds assessed using conventional audiometry with good reliability from 250-8000 Hz bilaterally using insert earphones and circumaural headphones     RIGHT:  normal hearing sensitivity through 6000 Hz sloping to moderate likely sensorineural hearing loss    LEFT:    normal hearing sensitivity through 3000 Hz sloping to mild to moderate essentially sensorineural hearing loss    Speech Reception Threshold:    RIGHT: 15 dB HL    LEFT:   10 dB HL  Results are in agreement with pure tone average.     Word Recognition Score:     RIGHT: 100% at 55 dB HL using NU-6 recorded word list.    LEFT:   96% at 55 dB HL using NU-6  recorded word list.    Discussed results with the patient.     Patient was returned to ENT for follow up.     Sahra Patel, CCC-A  Licensed Audiologist #[id number]  1/30/2023

## 2023-01-30 NOTE — NURSING NOTE
"Initial /77 (BP Location: Left arm, Patient Position: Chair, Cuff Size: Adult Regular)   Pulse 60   Temp 98.4  F (36.9  C) (Tympanic)   Wt 70.3 kg (155 lb)   LMP 06/27/2011   SpO2 98%   BMI 28.58 kg/m   Estimated body mass index is 28.58 kg/m  as calculated from the following:    Height as of 11/30/22: 1.568 m (5' 1.75\").    Weight as of this encounter: 70.3 kg (155 lb). .    Lyubov Fonseca LPN    "

## 2023-04-16 ENCOUNTER — HEALTH MAINTENANCE LETTER (OUTPATIENT)
Age: 63
End: 2023-04-16

## 2023-05-13 DIAGNOSIS — E78.5 HYPERLIPIDEMIA LDL GOAL <100: ICD-10-CM

## 2023-05-15 RX ORDER — ATORVASTATIN CALCIUM 20 MG/1
20 TABLET, FILM COATED ORAL DAILY
Qty: 90 TABLET | Refills: 0 | Status: SHIPPED | OUTPATIENT
Start: 2023-05-15 | End: 2023-09-21

## 2023-05-15 NOTE — TELEPHONE ENCOUNTER
"LDL is not ordered as a future lab  Requested Prescriptions   Pending Prescriptions Disp Refills    atorvastatin (LIPITOR) 20 MG tablet [Pharmacy Med Name: Atorvastatin Calcium Oral Tablet 20 MG] 90 tablet 0     Sig: Take 1 tablet (20 mg) by mouth daily       Statins Protocol Failed - 5/13/2023  2:00 AM        Failed - LDL on file in past 12 months     Recent Labs   Lab Test 05/12/22  0820   LDL 87               Passed - No abnormal creatine kinase in past 12 months     No lab results found.             Passed - Recent (12 mo) or future (30 days) visit within the authorizing provider's specialty     Patient has had an office visit with the authorizing provider or a provider within the authorizing providers department within the previous 12 mos or has a future within next 30 days. See \"Patient Info\" tab in inbasket, or \"Choose Columns\" in Meds & Orders section of the refill encounter.              Passed - Medication is active on med list        Passed - Patient is age 18 or older        Passed - No active pregnancy on record        Passed - No positive pregnancy test in past 12 months             "

## 2023-05-25 DIAGNOSIS — I10 ESSENTIAL HYPERTENSION: ICD-10-CM

## 2023-05-25 RX ORDER — ATENOLOL 100 MG/1
TABLET ORAL
Qty: 90 TABLET | Refills: 0 | Status: SHIPPED | OUTPATIENT
Start: 2023-05-25 | End: 2023-08-28

## 2023-05-25 RX ORDER — HYDROCHLOROTHIAZIDE 25 MG/1
TABLET ORAL
Qty: 90 TABLET | Refills: 1 | Status: SHIPPED | OUTPATIENT
Start: 2023-05-25 | End: 2023-09-21

## 2023-05-25 NOTE — TELEPHONE ENCOUNTER
"Requested Prescriptions   Pending Prescriptions Disp Refills    hydrochlorothiazide (HYDRODIURIL) 25 MG tablet [Pharmacy Med Name: hydroCHLOROthiazide Oral Tablet 25 MG] 90 tablet 0     Sig: Take 1 tablet (25 mg) by mouth once daily       Diuretics (Including Combos) Protocol Failed - 5/25/2023  2:00 AM        Failed - Normal serum creatinine on file in past 12 months     Recent Labs   Lab Test 05/12/22  0820   CR 0.70              Failed - Normal serum potassium on file in past 12 months     Recent Labs   Lab Test 05/12/22  0820   POTASSIUM 3.9                    Failed - Normal serum sodium on file in past 12 months     Recent Labs   Lab Test 05/12/22  0820                 Passed - Blood pressure under 140/90 in past 12 months     BP Readings from Last 3 Encounters:   01/30/23 128/77   11/30/22 120/70   11/09/22 139/82                 Passed - Recent (12 mo) or future (30 days) visit within the authorizing provider's specialty     Patient has had an office visit with the authorizing provider or a provider within the authorizing providers department within the previous 12 mos or has a future within next 30 days. See \"Patient Info\" tab in inbasket, or \"Choose Columns\" in Meds & Orders section of the refill encounter.              Passed - Medication is active on med list        Passed - Patient is age 18 or older        Passed - No active pregancy on record        Passed - No positive pregnancy test in past 12 months         Signed Prescriptions Disp Refills    atenolol (TENORMIN) 100 MG tablet 90 tablet 0     Sig: Take 1 tablet (100 mg) by mouth once daily       Beta-Blockers Protocol Passed - 5/25/2023  2:00 AM        Passed - Blood pressure under 140/90 in past 12 months     BP Readings from Last 3 Encounters:   01/30/23 128/77   11/30/22 120/70   11/09/22 139/82                 Passed - Patient is age 6 or older        Passed - Recent (12 mo) or future (30 days) visit within the authorizing provider's " "specialty     Patient has had an office visit with the authorizing provider or a provider within the authorizing providers department within the previous 12 mos or has a future within next 30 days. See \"Patient Info\" tab in inbasket, or \"Choose Columns\" in Meds & Orders section of the refill encounter.              Passed - Medication is active on med list             "

## 2023-08-27 DIAGNOSIS — I10 ESSENTIAL HYPERTENSION: ICD-10-CM

## 2023-08-28 RX ORDER — ATENOLOL 100 MG/1
TABLET ORAL
Qty: 90 TABLET | Refills: 1 | Status: SHIPPED | OUTPATIENT
Start: 2023-08-28 | End: 2023-09-21

## 2023-09-20 ASSESSMENT — ENCOUNTER SYMPTOMS
DYSURIA: 0
ABDOMINAL PAIN: 0
MYALGIAS: 0
FREQUENCY: 0
PALPITATIONS: 0
DIZZINESS: 0
HEMATOCHEZIA: 0
CONSTIPATION: 0
HEMATURIA: 0
HEADACHES: 0
CHILLS: 0
SORE THROAT: 0
NAUSEA: 0
JOINT SWELLING: 0
PARESTHESIAS: 0
WEAKNESS: 0
HEARTBURN: 0
COUGH: 0
NERVOUS/ANXIOUS: 0
ARTHRALGIAS: 0
BREAST MASS: 0
DIARRHEA: 0
EYE PAIN: 0
FEVER: 0

## 2023-09-21 ENCOUNTER — OFFICE VISIT (OUTPATIENT)
Dept: FAMILY MEDICINE | Facility: CLINIC | Age: 63
End: 2023-09-21
Payer: COMMERCIAL

## 2023-09-21 VITALS
OXYGEN SATURATION: 98 % | DIASTOLIC BLOOD PRESSURE: 78 MMHG | TEMPERATURE: 97.5 F | BODY MASS INDEX: 27.99 KG/M2 | HEART RATE: 60 BPM | SYSTOLIC BLOOD PRESSURE: 130 MMHG | HEIGHT: 62 IN | WEIGHT: 152.1 LBS | RESPIRATION RATE: 20 BRPM

## 2023-09-21 DIAGNOSIS — R73.09 ELEVATED GLUCOSE LEVEL: ICD-10-CM

## 2023-09-21 DIAGNOSIS — E78.5 HYPERLIPIDEMIA LDL GOAL <100: ICD-10-CM

## 2023-09-21 DIAGNOSIS — C50.919 MALIGNANT NEOPLASM OF FEMALE BREAST, UNSPECIFIED ESTROGEN RECEPTOR STATUS, UNSPECIFIED LATERALITY, UNSPECIFIED SITE OF BREAST (H): ICD-10-CM

## 2023-09-21 DIAGNOSIS — Z00.00 ANNUAL PHYSICAL EXAM: Primary | ICD-10-CM

## 2023-09-21 DIAGNOSIS — I10 ESSENTIAL HYPERTENSION WITH GOAL BLOOD PRESSURE LESS THAN 140/90: ICD-10-CM

## 2023-09-21 LAB
ANION GAP SERPL CALCULATED.3IONS-SCNC: 9 MMOL/L (ref 7–15)
BUN SERPL-MCNC: 15.8 MG/DL (ref 8–23)
CALCIUM SERPL-MCNC: 9.6 MG/DL (ref 8.8–10.2)
CHLORIDE SERPL-SCNC: 100 MMOL/L (ref 98–107)
CHOLEST SERPL-MCNC: 157 MG/DL
CREAT SERPL-MCNC: 0.66 MG/DL (ref 0.51–0.95)
DEPRECATED HCO3 PLAS-SCNC: 29 MMOL/L (ref 22–29)
EGFRCR SERPLBLD CKD-EPI 2021: >90 ML/MIN/1.73M2
GLUCOSE SERPL-MCNC: 132 MG/DL (ref 70–99)
HDLC SERPL-MCNC: 36 MG/DL
LDLC SERPL CALC-MCNC: 72 MG/DL
NONHDLC SERPL-MCNC: 121 MG/DL
POTASSIUM SERPL-SCNC: 3.7 MMOL/L (ref 3.4–5.3)
SODIUM SERPL-SCNC: 138 MMOL/L (ref 136–145)
TRIGL SERPL-MCNC: 247 MG/DL

## 2023-09-21 PROCEDURE — 80048 BASIC METABOLIC PNL TOTAL CA: CPT | Performed by: NURSE PRACTITIONER

## 2023-09-21 PROCEDURE — 80061 LIPID PANEL: CPT | Performed by: NURSE PRACTITIONER

## 2023-09-21 PROCEDURE — 90682 RIV4 VACC RECOMBINANT DNA IM: CPT | Performed by: NURSE PRACTITIONER

## 2023-09-21 PROCEDURE — 90677 PCV20 VACCINE IM: CPT | Performed by: NURSE PRACTITIONER

## 2023-09-21 PROCEDURE — 90471 IMMUNIZATION ADMIN: CPT | Performed by: NURSE PRACTITIONER

## 2023-09-21 PROCEDURE — 90472 IMMUNIZATION ADMIN EACH ADD: CPT | Performed by: NURSE PRACTITIONER

## 2023-09-21 PROCEDURE — 99214 OFFICE O/P EST MOD 30 MIN: CPT | Mod: 25 | Performed by: NURSE PRACTITIONER

## 2023-09-21 PROCEDURE — 99396 PREV VISIT EST AGE 40-64: CPT | Mod: 25 | Performed by: NURSE PRACTITIONER

## 2023-09-21 PROCEDURE — 36415 COLL VENOUS BLD VENIPUNCTURE: CPT | Performed by: NURSE PRACTITIONER

## 2023-09-21 RX ORDER — ATENOLOL 100 MG/1
100 TABLET ORAL DAILY
Qty: 90 TABLET | Refills: 3 | Status: SHIPPED | OUTPATIENT
Start: 2023-09-21

## 2023-09-21 RX ORDER — AMLODIPINE BESYLATE 5 MG/1
7.5 TABLET ORAL DAILY
Qty: 135 TABLET | Refills: 3 | Status: SHIPPED | OUTPATIENT
Start: 2023-09-21

## 2023-09-21 RX ORDER — HYDROCHLOROTHIAZIDE 25 MG/1
25 TABLET ORAL DAILY
Qty: 90 TABLET | Refills: 3 | Status: SHIPPED | OUTPATIENT
Start: 2023-09-21

## 2023-09-21 RX ORDER — ATORVASTATIN CALCIUM 20 MG/1
20 TABLET, FILM COATED ORAL DAILY
Qty: 90 TABLET | Refills: 3 | Status: SHIPPED | OUTPATIENT
Start: 2023-09-21 | End: 2024-09-10

## 2023-09-21 ASSESSMENT — ENCOUNTER SYMPTOMS
ARTHRALGIAS: 0
WEAKNESS: 0
HEMATOCHEZIA: 0
FREQUENCY: 0
JOINT SWELLING: 0
NERVOUS/ANXIOUS: 0
DYSURIA: 0
CONSTIPATION: 0
NAUSEA: 0
COUGH: 0
HEARTBURN: 0
BREAST MASS: 0
EYE PAIN: 0
SORE THROAT: 0
CHILLS: 0
FEVER: 0
ABDOMINAL PAIN: 0
HEMATURIA: 0
DIARRHEA: 0
DIZZINESS: 0
HEADACHES: 0
PALPITATIONS: 0
MYALGIAS: 0
PARESTHESIAS: 0

## 2023-09-21 ASSESSMENT — PAIN SCALES - GENERAL: PAINLEVEL: NO PAIN (0)

## 2023-09-21 NOTE — PROGRESS NOTES
SUBJECTIVE:   CC: Ashley is an 63 year old who presents for preventive health visit.       2023     8:28 AM   Additional Questions   Roomed by Uzma KIM     Healthy Habits:     Getting at least 3 servings of Calcium per day:  Yes    Bi-annual eye exam:  Yes    Dental care twice a year:  Yes    Sleep apnea or symptoms of sleep apnea:  None    Diet:  Regular (no restrictions)    Frequency of exercise:  1 day/week    Duration of exercise:  15-30 minutes    Taking medications regularly:  Yes    Medication side effects:  None    Additional concerns today:  No    Today's PHQ-2 Score:       2023     1:15 PM   PHQ-2 (  [employer])   Q1: Little interest or pleasure in doing things 0   Q2: Feeling down, depressed or hopeless 0   PHQ-2 Score 0   Q1: Little interest or pleasure in doing things Not at all   Q2: Feeling down, depressed or hopeless Not at all   PHQ-2 Score 0     Have you ever done Advance Care Planning? (For example, a Health Directive, POLST, or a discussion with a medical provider or your loved ones about your wishes): Yes, advance care planning is on file.    Social History     Tobacco Use    Smoking status: Every Day     Packs/day: 0.50     Years: 30.00     Pack years: 15.00     Types: Cigarettes    Smokeless tobacco: Never   Substance Use Topics    Alcohol use: Yes     Comment: occas           2023     1:14 PM   Alcohol Use   Prescreen: >3 drinks/day or >7 drinks/week? No     Reviewed orders with patient.  Reviewed health maintenance and updated orders accordingly - Yes  Lab work is in process  Labs reviewed in EPIC  BP Readings from Last 3 Encounters:   23 130/78   23 128/77   22 120/70    Wt Readings from Last 3 Encounters:   23 69 kg (152 lb 1.6 oz)   23 70.3 kg (155 lb)   22 70.4 kg (155 lb 4.8 oz)        Breast Cancer Screenin/20/2023     1:16 PM   Breast CA Risk Assessment (FHS-7)   Do you have a family history of breast, colon, or ovarian  "cancer? No / Unknown       Mammogram Screening: Recommended mammography every 1-2 years with patient discussion and risk factor consideration  Pertinent mammograms are reviewed under the imaging tab.    History of abnormal Pap smear: NO - age 30- 65 PAP every 3 years recommended      Latest Ref Rng & Units 11/9/2022     1:36 PM 6/9/2017     8:56 AM 6/9/2017     8:41 AM   PAP / HPV   PAP  Negative for Intraepithelial Lesion or Malignancy (NILM)      PAP (Historical)    NIL    HPV 16 DNA Negative Negative  Negative     HPV 18 DNA Negative Negative  Negative     Other HR HPV Negative Negative  Negative       Reviewed and updated as needed this visit by clinical staff   Tobacco  Allergies  Meds  Problems             Reviewed and updated as needed this visit by Provider      Problems                Review of Systems   Constitutional:  Negative for chills and fever.   HENT:  Negative for congestion, ear pain, hearing loss and sore throat.    Eyes:  Positive for visual disturbance. Negative for pain.   Respiratory:  Negative for cough.    Cardiovascular:  Negative for chest pain, palpitations and peripheral edema.   Gastrointestinal:  Negative for abdominal pain, constipation, diarrhea, heartburn, hematochezia and nausea.   Breasts:  Negative for tenderness, breast mass and discharge.   Genitourinary:  Negative for dysuria, frequency, genital sores, hematuria, pelvic pain, urgency, vaginal bleeding and vaginal discharge.   Musculoskeletal:  Negative for arthralgias, joint swelling and myalgias.   Skin:  Positive for rash.   Neurological:  Negative for dizziness, weakness, headaches and paresthesias.   Psychiatric/Behavioral:  Negative for mood changes. The patient is not nervous/anxious.        OBJECTIVE:   /78   Pulse 60   Temp 97.5  F (36.4  C) (Tympanic)   Resp 20   Ht 1.568 m (5' 1.75\")   Wt 69 kg (152 lb 1.6 oz)   LMP 06/27/2011   SpO2 98%   BMI 28.05 kg/m    Physical Exam  GENERAL: healthy, alert " "and no distress  EYES: Eyes grossly normal to inspection, PERRL and conjunctivae and sclerae normal  HENT: ear canals and TM's normal, nose and mouth without ulcers or lesions  NECK: no adenopathy, no asymmetry, masses, or scars and thyroid normal to palpation  RESP: lungs clear to auscultation - no rales, rhonchi or wheezes  CV: regular rate and rhythm, normal S1 S2, no S3 or S4, no murmur, click or rub, no peripheral edema and peripheral pulses strong  MS: no gross musculoskeletal defects noted, no edema  SKIN: no suspicious lesions or rashes  NEURO: Normal strength and tone, mentation intact and speech normal  PSYCH: mentation appears normal, affect normal/bright    Diagnostic Test Results:  Labs reviewed in Epic    ASSESSMENT/PLAN:   (Z00.00) Annual physical exam  (primary encounter diagnosis)  Plan: Lipid panel reflex to direct LDL Fasting, Basic        metabolic panel  (Ca, Cl, CO2, Creat, Gluc, K,         Na, BUN),     (C50.919) Malignant neoplasm of female breast, unspecified estrogen receptor status, unspecified laterality, unspecified site of breast (H)  Comment: all recent mammograms normal, no recurrent cancer       (E78.5) Hyperlipidemia LDL goal <100  Comment: well controlled, no side effects   Plan: continue atorvastatin (LIPITOR) 20 MG tablet            (I10) Essential hypertension with goal blood pressure less than 140/90  Comment: well controlled, no side effects from medications   Plan: amLODIPine (NORVASC) 5 MG tablet, atenolol         (TENORMIN) 100 MG tablet            (R73.09) Elevated glucose level  Comment: recommended to check A1C to rule out possible prediabetes/diabetes  Plan: Hemoglobin A1c            COUNSELING:  Reviewed preventive health counseling, as reflected in patient instructions       Regular exercise       Healthy diet/nutrition      BMI:   Estimated body mass index is 28.05 kg/m  as calculated from the following:    Height as of this encounter: 1.568 m (5' 1.75\").    Weight " as of this encounter: 69 kg (152 lb 1.6 oz).         She reports that she has been smoking cigarettes. She has a 15.00 pack-year smoking history. She has never used smokeless tobacco.  Nicotine/Tobacco Cessation Plan:   Information offered: Patient not interested at this time          CAROLA Vargas CNP  [facility]

## 2023-10-09 ENCOUNTER — HOSPITAL ENCOUNTER (OUTPATIENT)
Dept: MAMMOGRAPHY | Facility: CLINIC | Age: 63
Discharge: HOME | End: 2023-10-09
Attending: NURSE PRACTITIONER | Admitting: NURSE PRACTITIONER
Payer: COMMERCIAL

## 2023-10-09 DIAGNOSIS — Z12.31 VISIT FOR SCREENING MAMMOGRAM: ICD-10-CM

## 2023-10-09 PROCEDURE — 77067 SCR MAMMO BI INCL CAD: CPT

## 2024-04-24 ENCOUNTER — OFFICE VISIT (OUTPATIENT)
Dept: FAMILY MEDICINE | Facility: CLINIC | Age: 64
End: 2024-04-24
Payer: COMMERCIAL

## 2024-04-24 VITALS
TEMPERATURE: 98.8 F | OXYGEN SATURATION: 97 % | SYSTOLIC BLOOD PRESSURE: 124 MMHG | HEART RATE: 60 BPM | RESPIRATION RATE: 18 BRPM | BODY MASS INDEX: 27.27 KG/M2 | WEIGHT: 148.2 LBS | HEIGHT: 62 IN | DIASTOLIC BLOOD PRESSURE: 62 MMHG

## 2024-04-24 DIAGNOSIS — I10 ESSENTIAL HYPERTENSION: ICD-10-CM

## 2024-04-24 DIAGNOSIS — G62.9 NEUROPATHY: ICD-10-CM

## 2024-04-24 DIAGNOSIS — E78.5 HYPERLIPIDEMIA LDL GOAL <100: Primary | ICD-10-CM

## 2024-04-24 DIAGNOSIS — Z12.11 SCREEN FOR COLON CANCER: ICD-10-CM

## 2024-04-24 DIAGNOSIS — E78.2 MIXED HYPERLIPIDEMIA: ICD-10-CM

## 2024-04-24 DIAGNOSIS — R73.03 PREDIABETES: ICD-10-CM

## 2024-04-24 DIAGNOSIS — Z01.818 PREOP GENERAL PHYSICAL EXAM: Primary | ICD-10-CM

## 2024-04-24 DIAGNOSIS — H26.9 CATARACT OF BOTH EYES, UNSPECIFIED CATARACT TYPE: ICD-10-CM

## 2024-04-24 LAB
ANION GAP SERPL CALCULATED.3IONS-SCNC: 13 MMOL/L (ref 7–15)
BUN SERPL-MCNC: 17.4 MG/DL (ref 8–23)
CALCIUM SERPL-MCNC: 10.3 MG/DL (ref 8.8–10.2)
CHLORIDE SERPL-SCNC: 103 MMOL/L (ref 98–107)
CHOLEST SERPL-MCNC: 186 MG/DL
CREAT SERPL-MCNC: 0.69 MG/DL (ref 0.51–0.95)
DEPRECATED HCO3 PLAS-SCNC: 25 MMOL/L (ref 22–29)
EGFRCR SERPLBLD CKD-EPI 2021: >90 ML/MIN/1.73M2
FASTING STATUS PATIENT QL REPORTED: YES
GLUCOSE SERPL-MCNC: 137 MG/DL (ref 70–99)
HBA1C MFR BLD: 6 % (ref 0–5.6)
HDLC SERPL-MCNC: 40 MG/DL
LDLC SERPL CALC-MCNC: 109 MG/DL
NONHDLC SERPL-MCNC: 146 MG/DL
POTASSIUM SERPL-SCNC: 4 MMOL/L (ref 3.4–5.3)
SODIUM SERPL-SCNC: 141 MMOL/L (ref 135–145)
TRIGL SERPL-MCNC: 186 MG/DL
TSH SERPL DL<=0.005 MIU/L-ACNC: 1.14 UIU/ML (ref 0.3–4.2)

## 2024-04-24 PROCEDURE — 36415 COLL VENOUS BLD VENIPUNCTURE: CPT | Performed by: NURSE PRACTITIONER

## 2024-04-24 PROCEDURE — 83036 HEMOGLOBIN GLYCOSYLATED A1C: CPT | Performed by: NURSE PRACTITIONER

## 2024-04-24 PROCEDURE — 82607 VITAMIN B-12: CPT | Performed by: NURSE PRACTITIONER

## 2024-04-24 PROCEDURE — 99214 OFFICE O/P EST MOD 30 MIN: CPT | Performed by: NURSE PRACTITIONER

## 2024-04-24 PROCEDURE — 80048 BASIC METABOLIC PNL TOTAL CA: CPT | Performed by: NURSE PRACTITIONER

## 2024-04-24 PROCEDURE — 80061 LIPID PANEL: CPT | Performed by: NURSE PRACTITIONER

## 2024-04-24 PROCEDURE — 84443 ASSAY THYROID STIM HORMONE: CPT | Performed by: NURSE PRACTITIONER

## 2024-04-24 ASSESSMENT — PAIN SCALES - GENERAL: PAINLEVEL: NO PAIN (0)

## 2024-04-24 NOTE — PROGRESS NOTES
Preoperative Evaluation  [facility]  [address] [address]  Phone: [phone]  Primary Provider: Amando Castellon  Pre-op Performing Provider: AMANDO CASTELLON      Ashley is a 63 year old, presenting for the following:  Pre-Op Exam (DOS 5/2/24)        4/24/2024     9:54 AM   Additional Questions   Roomed by Navya Eric   Accompanied by na         4/24/2024   Forms   Any forms needing to be completed Yes         4/24/2024     9:54 AM   Patient Reported Additional Medications   Patient reports taking the following new medications none     Surgical Information  Surgery/Procedure: Cataract removal both eyes  Surgery Location: [facility]  Surgeon: Dr Calvillo  Surgery Date: 5/2/24 &5/16/24  Time of Surgery: unknown  Where patient plans to recover: At home with family  Fax number for surgical facility: [phone]    Assessment & Plan     The proposed surgical procedure is considered LOW risk.    Preop general physical exam    - Basic metabolic panel  (Ca, Cl, CO2, Creat, Gluc, K, Na, BUN); Future  - Basic metabolic panel  (Ca, Cl, CO2, Creat, Gluc, K, Na, BUN)    Cataract of both eyes, unspecified cataract type  -cleared for cataract surgery     Screen for colon cancer    - Fecal colorectal cancer screen FIT - Future (S+30); Future  - Fecal colorectal cancer screen FIT - Future (S+30)    Essential hypertension  Well controlled   - Basic metabolic panel  (Ca, Cl, CO2, Creat, Gluc, K, Na, BUN); Future  - Basic metabolic panel  (Ca, Cl, CO2, Creat, Gluc, K, Na, BUN)    Mixed hyperlipidemia    - Lipid panel reflex to direct LDL Fasting; Future  - Lipid panel reflex to direct LDL Fasting    Neuropathy  -complains of chronic, mild numbness and tingling on the plantar surface of bilateral feet   -unclear etiology, possibly due to prediabetes   - TSH with free T4 reflex; Future  - Vitamin B12; Future  - Hemoglobin A1c; Future  - TSH with free T4  reflex  - Vitamin B12  - Hemoglobin A1c    Prediabetes  -advised patient to follow healthy diet and limit sugars, carbs in her diet             - No identified additional risk factors other than previously addressed    Antiplatelet or Anticoagulation Medication Instructions   - Patient is on no antiplatelet or anticoagulation medications.    Additional Medication Instructions  Patient is to take all scheduled medications on the day of surgery    Recommendation  APPROVAL GIVEN to proceed with proposed procedure, without further diagnostic evaluation.        Subjective       HPI related to upcoming procedure: caratacts        4/24/2024     7:20 AM   Preop Questions   1. Have you ever had a heart attack or stroke? No   2. Have you ever had surgery on your heart or blood vessels, such as a stent placement, a coronary artery bypass, or surgery on an artery in your head, neck, heart, or legs? No   3. Do you have chest pain with activity? No   4. Do you have a history of  heart failure? No   5. Do you currently have a cold, bronchitis or symptoms of other infection? No   6. Do you have a cough, shortness of breath, or wheezing? No   7. Do you or anyone in your family have previous history of blood clots? No   8. Do you or does anyone in your family have a serious bleeding problem such as prolonged bleeding following surgeries or cuts? No   9. Have you ever had problems with anemia or been told to take iron pills? No   10. Have you had any abnormal blood loss such as black, tarry or bloody stools, or abnormal vaginal bleeding? No   11. Have you ever had a blood transfusion? No   12. Are you willing to have a blood transfusion if it is medically needed before, during, or after your surgery? Yes   13. Have you or any of your relatives ever had problems with anesthesia? No   14. Do you have sleep apnea, excessive snoring or daytime drowsiness? No   15. Do you have any artifical heart valves or other implanted medical devices  like a pacemaker, defibrillator, or continuous glucose monitor? No   16. Do you have artificial joints? No   17. Are you allergic to latex? No         Preoperative Review of    reviewed - no record of controlled substances prescribed.      Status of Chronic Conditions:  See problem list for active medical problems.  Problems all longstanding and stable, except as noted/documented.  See ROS for pertinent symptoms related to these conditions.    Patient Active Problem List    Diagnosis Date Noted    Essential hypertension with goal blood pressure less than 140/90 05/24/2016     Priority: Medium    GERD (gastroesophageal reflux disease) 11/12/2012     Priority: Medium    Breast cancer (H) 07/20/2011     Priority: Medium     Invasive ductal carcinoma 7/2011      Post-menopausal bleeding 07/20/2011     Priority: Medium    Hyperlipidemia LDL goal <130 10/31/2010     Priority: Medium     High trigs with low HDL      Condyloma acuminatum 07/07/2006     Priority: Medium    Tobacco use disorder 02/28/2006     Priority: Medium     Unwilling to quit yet      Essential hypertension 06/27/2005     Priority: Medium     Ok at home blood pressure           Generalized anxiety disorder 06/27/2005     Priority: Medium    Other specified periodontal diseases 06/27/2005     Priority: Medium      Past Medical History:   Diagnosis Date    Breast cancer (H)     Cancer (H) Aug. 2011    Breast Cancer    Closed fracture of unspecified part of fibula 2004    Right distal fibula fracture    Hypertension 2001    Panic disorder without agoraphobia      Past Surgical History:   Procedure Laterality Date    BIOPSY  Aug 2011    Breast Cancer    BIOPSY BREAST NEEDLE LOCALIZATION, BIOPSY NODE SENTINEL, COMBINED  07/27/2011    Procedure:COMBINED BIOPSY BREAST NEEDLE LOCALIZATION, BIOPSY NODE SENTINEL; Oeft  breast lumpectomy with sentinel lymph node biopsy-inj by radiologist at1330-path in house; Surgeon:ISADORA AGUILAR; Location:[place] [facility]     GYN SURGERY      LUMPECTOMY BREAST  08/10/2011    Procedure:LUMPECTOMY BREAST; Re Excision Left Lumpectomy; Surgeon:ISADORA AGUILAR; Location:[facility] OR    SURGICAL HISTORY OF -   01/01/1978    [place] teeth extracted     Current Outpatient Medications   Medication Sig Dispense Refill    amLODIPine (NORVASC) 5 MG tablet Take 1.5 tablets (7.5 mg) by mouth daily 135 tablet 3    ascorbic acid (VITAMIN C) 1000 MG TABS Take 1,000 mg by mouth daily      atenolol (TENORMIN) 100 MG tablet Take 1 tablet (100 mg) by mouth daily 90 tablet 3    atorvastatin (LIPITOR) 20 MG tablet Take 1 tablet (20 mg) by mouth daily 90 tablet 3    Calcium Citrate (CITRACAL OR) Take 2 tablets by mouth daily Patient states taking 1200mg      hydrochlorothiazide (HYDRODIURIL) 25 MG tablet Take 1 tablet (25 mg) by mouth daily 90 tablet 3    vitamin D3 (CHOLECALCIFEROL) 50 mcg (2000 units) tablet Take 1 tablet by mouth daily      alendronate (FOSAMAX) 70 MG tablet Take 1 tablet (70 mg) by mouth every 7 days (Patient not taking: Reported on 1/30/2023) 12 tablet 3    fluticasone (FLONASE) 50 MCG/ACT nasal spray Spray 1 spray into both nostrils daily (Patient not taking: Reported on 1/30/2023) 16 g 0    loratadine (CLARITIN) 10 MG tablet Take 1 tablet (10 mg) by mouth daily (Patient not taking: Reported on 1/30/2023) 30 tablet 1       Allergies   Allergen Reactions    Zyban [Bupropion Hcl] Other (See Comments) and Swelling     Higher blood pressure        Social History     Tobacco Use    Smoking status: Every Day     Current packs/day: 0.50     Average packs/day: 0.7 packs/day for 45.0 years (30.0 ttl pk-yrs)     Types: Cigarettes    Smokeless tobacco: Never   Substance Use Topics    Alcohol use: Yes     Comment: occas       History   Drug Use No         Review of Systems    Review of Systems  Constitutional, HEENT, cardiovascular, pulmonary, gi and gu systems are negative, except as otherwise noted.    Objective    /62   Pulse 60    "Temp 98.8  F (37.1  C) (Tympanic)   Resp 18   Ht 1.562 m (5' 1.5\")   Wt 67.2 kg (148 lb 3.2 oz)   LMP 06/27/2011   SpO2 97%   BMI 27.55 kg/m     Estimated body mass index is 27.55 kg/m  as calculated from the following:    Height as of this encounter: 1.562 m (5' 1.5\").    Weight as of this encounter: 67.2 kg (148 lb 3.2 oz).  Physical Exam  GENERAL: alert and no distress  EYES: Eyes grossly normal to inspection, PERRL and conjunctivae and sclerae normal  NECK: no adenopathy, no asymmetry, masses, or scars  RESP: lungs clear to auscultation - no rales, rhonchi or wheezes  CV: regular rate and rhythm, normal S1 S2, no S3 or S4, no murmur, click or rub, no peripheral edema   MS: no gross musculoskeletal defects noted, no edema  SKIN: no suspicious lesions or rashes  NEURO: Normal strength and tone, mentation intact and speech normal  PSYCH: mentation appears normal, affect normal/bright    Recent Labs   Lab Test 09/21/23  0912 05/12/22  0820    137   POTASSIUM 3.7 3.9   CR 0.66 0.70        Diagnostics  Recent Results (from the past 24 hour(s))   Hemoglobin A1c    Collection Time: 04/24/24 10:24 AM   Result Value Ref Range    Hemoglobin A1C 6.0 (H) 0.0 - 5.6 %      No EKG required for low risk surgery (cataract, skin procedure, breast biopsy, etc).    Revised Cardiac Risk Index (RCRI)  The patient has the following serious cardiovascular risks for perioperative complications:   - No serious cardiac risks = 0 points     RCRI Interpretation: 0 points: Class I (very low risk - 0.4% complication rate)         Signed Electronically by: CAROLA Vargas CNP  Copy of this evaluation report is provided to requesting physician.      "

## 2024-04-25 LAB — VIT B12 SERPL-MCNC: 370 PG/ML (ref 232–1245)

## 2024-04-30 PROCEDURE — 82274 ASSAY TEST FOR BLOOD FECAL: CPT | Performed by: NURSE PRACTITIONER

## 2024-05-06 LAB — HEMOCCULT STL QL IA: NEGATIVE

## 2024-09-08 DIAGNOSIS — E78.5 HYPERLIPIDEMIA LDL GOAL <100: ICD-10-CM

## 2024-09-10 RX ORDER — ATORVASTATIN CALCIUM 20 MG/1
20 TABLET, FILM COATED ORAL DAILY
Qty: 90 TABLET | Refills: 1 | Status: SHIPPED | OUTPATIENT
Start: 2024-09-10

## 2024-11-09 ENCOUNTER — HEALTH MAINTENANCE LETTER (OUTPATIENT)
Age: 64
End: 2024-11-09

## 2024-11-22 DIAGNOSIS — I10 ESSENTIAL HYPERTENSION WITH GOAL BLOOD PRESSURE LESS THAN 140/90: ICD-10-CM

## 2024-11-25 RX ORDER — AMLODIPINE BESYLATE 5 MG/1
TABLET ORAL
Qty: 135 TABLET | Refills: 0 | Status: SHIPPED | OUTPATIENT
Start: 2024-11-25

## 2024-11-25 RX ORDER — ATENOLOL 100 MG/1
100 TABLET ORAL DAILY
Qty: 90 TABLET | Refills: 0 | Status: SHIPPED | OUTPATIENT
Start: 2024-11-25

## 2024-11-25 RX ORDER — HYDROCHLOROTHIAZIDE 25 MG/1
25 TABLET ORAL DAILY
Qty: 90 TABLET | Refills: 0 | Status: SHIPPED | OUTPATIENT
Start: 2024-11-25

## 2024-12-31 ENCOUNTER — HOSPITAL ENCOUNTER (OUTPATIENT)
Dept: MAMMOGRAPHY | Facility: CLINIC | Age: 64
Discharge: HOME | End: 2024-12-31
Attending: NURSE PRACTITIONER
Payer: COMMERCIAL

## 2024-12-31 DIAGNOSIS — Z12.31 VISIT FOR SCREENING MAMMOGRAM: ICD-10-CM

## 2024-12-31 PROCEDURE — 77067 SCR MAMMO BI INCL CAD: CPT

## 2024-12-31 PROCEDURE — 77063 BREAST TOMOSYNTHESIS BI: CPT

## 2025-03-10 DIAGNOSIS — E78.5 HYPERLIPIDEMIA LDL GOAL <100: ICD-10-CM

## 2025-03-10 RX ORDER — ATORVASTATIN CALCIUM 20 MG/1
20 TABLET, FILM COATED ORAL DAILY
Qty: 90 TABLET | Refills: 0 | Status: SHIPPED | OUTPATIENT
Start: 2025-03-10

## 2025-04-23 ENCOUNTER — TELEPHONE (OUTPATIENT)
Dept: FAMILY MEDICINE | Facility: CLINIC | Age: 65
End: 2025-04-23
Payer: COMMERCIAL

## 2025-04-23 NOTE — TELEPHONE ENCOUNTER
Patient Quality Outreach    Patient is due for the following:   Colon Cancer Screening  Physical Annual Wellness Visit      Topic Date Due    Zoster (Shingles) Vaccine (1 of 2) Never done    Flu Vaccine (1) 09/01/2024    COVID-19 Vaccine (6 - 2024-25 season) 09/01/2024       Action(s) Taken:   Schedule a Annual Wellness Visit, Colon cancer screen, immunizations    Type of outreach:    Sent [employer] message.    Questions for provider review:    None         Janine Moreno  Chart routed to None.

## 2025-05-31 DIAGNOSIS — I10 ESSENTIAL HYPERTENSION WITH GOAL BLOOD PRESSURE LESS THAN 140/90: ICD-10-CM

## 2025-06-02 RX ORDER — AMLODIPINE BESYLATE 5 MG/1
TABLET ORAL
Qty: 45 TABLET | Refills: 0 | Status: SHIPPED | OUTPATIENT
Start: 2025-06-02

## 2025-06-02 RX ORDER — HYDROCHLOROTHIAZIDE 25 MG/1
25 TABLET ORAL DAILY
Qty: 30 TABLET | Refills: 0 | Status: SHIPPED | OUTPATIENT
Start: 2025-06-02

## 2025-06-02 RX ORDER — ATENOLOL 100 MG/1
100 TABLET ORAL DAILY
Qty: 30 TABLET | Refills: 0 | Status: SHIPPED | OUTPATIENT
Start: 2025-06-02

## 2025-06-02 NOTE — TELEPHONE ENCOUNTER
GFR Estimate   Date Value Ref Range Status   04/24/2024 >90 >60 mL/min/1.73m2 Final   07/22/2020 >90 >60 mL/min/[1.73_m2] Final     Comment:     Non  GFR Calc  Starting 12/18/2018, serum creatinine based estimated GFR (eGFR) will be   calculated using the Chronic Kidney Disease Epidemiology Collaboration   (CKD-EPI) equation.       Potassium   Date Value Ref Range Status   04/24/2024 4.0 3.4 - 5.3 mmol/L Final   05/12/2022 3.9 3.4 - 5.3 mmol/L Final   07/22/2020 3.9 3.4 - 5.3 mmol/L Final     Medication is being filled for 1 time refill only due to:  Patient needs to be seen because it has been more than one year since last visit. Antonieta Kenyon RN on 6/2/2025 at 12:32 PM

## 2025-06-18 ENCOUNTER — RESULTS FOLLOW-UP (OUTPATIENT)
Dept: FAMILY MEDICINE | Facility: CLINIC | Age: 65
End: 2025-06-18